# Patient Record
Sex: FEMALE | Race: AMERICAN INDIAN OR ALASKA NATIVE
[De-identification: names, ages, dates, MRNs, and addresses within clinical notes are randomized per-mention and may not be internally consistent; named-entity substitution may affect disease eponyms.]

---

## 2018-07-26 ENCOUNTER — HOSPITAL ENCOUNTER (EMERGENCY)
Dept: HOSPITAL 5 - ED | Age: 38
LOS: 1 days | Discharge: HOME | End: 2018-07-27
Payer: SELF-PAY

## 2018-07-26 DIAGNOSIS — R04.0: Primary | ICD-10-CM

## 2018-07-26 DIAGNOSIS — Z87.891: ICD-10-CM

## 2018-07-26 DIAGNOSIS — I10: ICD-10-CM

## 2018-07-26 PROCEDURE — 99282 EMERGENCY DEPT VISIT SF MDM: CPT

## 2018-07-26 NOTE — EMERGENCY DEPARTMENT REPORT
ED ENT HPI





- General


Chief complaint: Nosebleed


Stated complaint: NOSE BLEED


Time Seen by Provider: 07/26/18 23:28


Source: patient


Mode of arrival: Ambulatory


Limitations: No Limitations





- History of Present Illness


Initial comments: 





This is a 37-year-old female who is unknown to this provider previously.  She 

reports that she is not pregnant.  She presents to the ER with a complaint of 

nontraumatic nosebleeding.  The bleeding is primarily in the right side of the 

nose, there is no trauma there is no cocaine use, there is no nasal picking.  

She denies easy bleeding, bruising, and denies other complaints.


MD complaint: epistaxis


-: Gradual, hour(s)


Location: nose


Severity: moderate


Consistency: intermittent


Improves with: pressure


Worsens with: none





- Related Data


 Home Medications











 Medication  Instructions  Recorded  Confirmed  Last Taken


 


Lisinopril [Zestril TAB] 40 mg PO QDAY 07/26/18 07/26/18 07/26/18


 


Metoprolol [Lopressor TAB] 50 mg PO BID 07/26/18 07/26/18 07/26/18


 


Terazosin [Hytrin] 1 mg PO DAILY 07/26/18 07/26/18 Unknown


 


cloNIDine [Catapres] 0.2 mg PO DAILY 07/26/18 07/26/18 07/26/18








 Previous Rx's











 Medication  Instructions  Recorded  Last Taken  Type


 


Fluticasone [Flonase] 1 spray NS QDAY #1 bottle 07/27/18 Unknown Rx











 Allergies











Allergy/AdvReac Type Severity Reaction Status Date / Time


 


No Known Allergies Allergy   Unverified 07/26/18 20:58














ED Dental HPI





- General


Chief complaint: Nosebleed


Stated complaint: NOSE BLEED


Time Seen by Provider: 07/26/18 23:28


Source: patient


Mode of arrival: Ambulatory


Limitations: No Limitations





- Related Data


 Home Medications











 Medication  Instructions  Recorded  Confirmed  Last Taken


 


Lisinopril [Zestril TAB] 40 mg PO QDAY 07/26/18 07/26/18 07/26/18


 


Metoprolol [Lopressor TAB] 50 mg PO BID 07/26/18 07/26/18 07/26/18


 


Terazosin [Hytrin] 1 mg PO DAILY 07/26/18 07/26/18 Unknown


 


cloNIDine [Catapres] 0.2 mg PO DAILY 07/26/18 07/26/18 07/26/18








 Previous Rx's











 Medication  Instructions  Recorded  Last Taken  Type


 


Fluticasone [Flonase] 1 spray NS QDAY #1 bottle 07/27/18 Unknown Rx











 Allergies











Allergy/AdvReac Type Severity Reaction Status Date / Time


 


No Known Allergies Allergy   Unverified 07/26/18 20:58














ED Review of Systems


ROS: 


Stated complaint: NOSE BLEED


Other details as noted in HPI





Constitutional: denies: fever


Eyes: denies: eye discharge


ENT: epistaxis.  denies: congestion


Respiratory: denies: cough


Cardiovascular: denies: chest pain


Gastrointestinal: denies: abdominal pain


Genitourinary: denies: dysuria


Neurological: denies: headache


Psychiatric: anxiety





ED Past Medical Hx





- Past Medical History


Previous Medical History?: Yes


Hx Hypertension: Yes





- Surgical History


Past Surgical History?: No





- Social History


Smoking Status: Former Smoker


Substance Use Type: None





- Medications


Home Medications: 


 Home Medications











 Medication  Instructions  Recorded  Confirmed  Last Taken  Type


 


Lisinopril [Zestril TAB] 40 mg PO QDAY 07/26/18 07/26/18 07/26/18 History


 


Metoprolol [Lopressor TAB] 50 mg PO BID 07/26/18 07/26/18 07/26/18 History


 


Terazosin [Hytrin] 1 mg PO DAILY 07/26/18 07/26/18 Unknown History


 


cloNIDine [Catapres] 0.2 mg PO DAILY 07/26/18 07/26/18 07/26/18 History


 


Fluticasone [Flonase] 1 spray NS QDAY #1 bottle 07/27/18  Unknown Rx














ED Physical Exam





- General


Limitations: No Limitations


General appearance: alert, in no apparent distress





- Head


Head exam: Present: atraumatic, normocephalic





- Eye


Eye exam: Present: normal appearance, EOMI.  Absent: nystagmus





- ENT


ENT exam: Present: normal orophraynx (speaking in full sentences.  No stridor.  

No obvious intraoral bleeding.), mucous membranes moist, normal external ear 

exam, other (there is minimal oozing noted in the right nose, no obvious 

bruising noted in the left nose.)





- Neck


Neck exam: Present: normal inspection, full ROM.  Absent: tenderness, 

meningismus





- Respiratory


Respiratory exam: Present: normal lung sounds bilaterally.  Absent: respiratory 

distress





- Cardiovascular


Cardiovascular Exam: Present: regular rate, normal rhythm, normal heart sounds.

  Absent: bradycardia, tachycardia, irregular rhythm, systolic murmur, 

diastolic murmur, rubs, gallop





- GI/Abdominal


GI/Abdominal exam: Present: soft.  Absent: distended, tenderness, guarding, 

rebound, rigid, pulsatile mass





- Extremities Exam


Extremities exam: Present: normal inspection, full ROM, other (2+ pulses noted 

in the bilateral upper, lower extremities.  Compartments soft.  No long bony 

tenderness.  The pelvis is stable.).  Absent: calf tenderness





- Back Exam


Back exam: Present: normal inspection, full ROM.  Absent: paraspinal tenderness

, vertebral tenderness





- Neurological Exam


Neurological exam: Present: alert, oriented X3, CN II-XII intact, normal gait, 

other (Extraocular movements intact.  Tongue midline.  No facial droop.  Facial 

sensation intact to light touch in the V1, V2, V3 distribution bilaterally.  5 

and 5 strength in 4 extremities..  Sensation is intact to light touch in 4 

extremities.).  Absent: motor sensory deficit





- Psychiatric


Psychiatric exam: Present: normal affect, normal mood





- Skin


Skin exam: Present: warm, dry, intact, normal color.  Absent: rash





ED Course


 Vital Signs











  07/26/18 07/26/18 07/27/18





  20:59 21:43 00:06


 


Temperature 98.6 F  


 


Pulse Rate 150 H 144 H 83


 


Respiratory 20  20





Rate   


 


Blood Pressure 254/177 254/177 


 


Blood Pressure   233/164





[Left]   


 


O2 Sat by Pulse 100  98





Oximetry   














- Reevaluation(s)


Reevaluation #1: 





07/27/18 00:10


Differential diagnosis, including not limited to: Epistaxis, incidental 

hypertension











Assessment and plan: 37-year-old female with nontraumatic nasal bleeding, 

mostly resolved.  Hypertension is appreciated.  She reports not taking her 

antihypertensive medication but later on today.  Please reference the [employer] 

[employer] critical policy on hypertension that is not 

symptomatically.  Patient has no clinical symptoms of end organ dysfunction at 

this time.











Patient had 2 sprays of oxymetolazone instilled in the right nostril, and a 2 x 

2 project was soaked with 1% lidocaine with epinephrine.  Patient is currently 

applying direct pressure to the nose.








Reevaluation #2: 





07/27/18 00:50


Patient has been reassessed multiple times by me while in the emergency 

department.  Her nose bleeding has essentially resolved, her tachycardia has 

resolved.  She feels comfortable to go home and denies physical pain and other 

complaints at this time.  The patient can follow up with her outpatient primary 

care doctor for her asymptomatic elevated blood pressure.





- Procedure Description


Procedures done: The patient instilled 2 sprays of Afrin into the right 

nostril.  A 2 x 2 pledget was then soaked with 1% lidocaine and epinephrine, 

twisted up and inserted into the right nostril.  A tongue depressor device was 

then applied to the nose, and the patient held direct digital pressure for 20 

minutes.  This was repeated an additional time.  The patient tolerated the 

procedure well, and with no difficulty.


Critical care attestation.: 


If time is entered above; I have spent that time in minutes in the direct care 

of this critically ill patient, excluding procedure time.








ED Disposition


Clinical Impression: 


 Epistaxis





Disposition: DC-01 TO HOME OR SELFCARE


Is pt being admited?: No


Does the pt Need Aspirin: No


Condition: Good


Instructions:  Epistaxis (ED)


Additional Instructions: 


Continue outpatient medications.  Follow up with the primary care doctor within 

the next month for elevated blood pressure.  Blood pressure was very elevated 

in the emergency room, most likely secondary to underlying nasal bleeding.  

However this should be followed up by her primary care doctor.  Long-term 

complications of hypertension and elevated blood pressure include stroke, heart 

attack, disability.  Patient may use the Afrin once every 12 hours, for no more 

than 3 days consecutively.  Patient may also use Flonase as directed.  Bleeding 

may recur within the next few days.  If bleeding reoccurs, instill 2 sprays of 

Afrin into the affected nostril, coat  a 2 x 2 pledget with Afrin as you were 

shown in the emergency room, roll up the pledget and inserted into the nostril, 

with 2 cm of pledget hanging out of the nose.  Then, applied a tongue depressor 

device to apply direct pressure to the nose, and hold fingertips over the 

tongue depressor device for at least 20 minutes.  This will typically stop a 

recurrent nosebleed.  If this does not stop the nosebleed, please return to the 

emergency room right away.








Please return to the ER right away with new pain, worsened pain, migration of 

pain, confusion, projectile vomiting, change in mental status, inability to 

tolerate liquid feeds.  Please follow up with an otolaryngology specialist, 

such as Dr. Emmanuel Coyle within the next 2 weeks if nose bleeding does not 

stop.














Referrals: 


PRIMARY CARE,MD [Primary Care Provider] - 3-5 Days


KOLTON ROSE MD [Staff Physician] - 3-5 Days


PHILLIP CANCHOLA MD [Staff Physician] - 3-5 Days

## 2018-07-27 VITALS — DIASTOLIC BLOOD PRESSURE: 164 MMHG | SYSTOLIC BLOOD PRESSURE: 233 MMHG

## 2020-08-26 ENCOUNTER — HOSPITAL ENCOUNTER (INPATIENT)
Dept: HOSPITAL 5 - ED | Age: 40
LOS: 10 days | Discharge: HOME | DRG: 654 | End: 2020-09-05
Attending: INTERNAL MEDICINE | Admitting: HOSPITALIST
Payer: COMMERCIAL

## 2020-08-26 ENCOUNTER — HOSPITAL ENCOUNTER (EMERGENCY)
Dept: HOSPITAL 5 - ED | Age: 40
Discharge: LEFT BEFORE DISCHARGE | End: 2020-08-26
Payer: SELF-PAY

## 2020-08-26 VITALS — SYSTOLIC BLOOD PRESSURE: 152 MMHG | DIASTOLIC BLOOD PRESSURE: 113 MMHG

## 2020-08-26 DIAGNOSIS — E66.9: ICD-10-CM

## 2020-08-26 DIAGNOSIS — K57.20: ICD-10-CM

## 2020-08-26 DIAGNOSIS — Z53.21: ICD-10-CM

## 2020-08-26 DIAGNOSIS — G47.30: ICD-10-CM

## 2020-08-26 DIAGNOSIS — N17.9: ICD-10-CM

## 2020-08-26 DIAGNOSIS — T85.838A: ICD-10-CM

## 2020-08-26 DIAGNOSIS — Z83.3: ICD-10-CM

## 2020-08-26 DIAGNOSIS — Z87.891: ICD-10-CM
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
## 2020-08-26 DIAGNOSIS — Z82.49: ICD-10-CM

## 2020-08-26 DIAGNOSIS — I50.9: ICD-10-CM

## 2020-08-26 DIAGNOSIS — N32.2: Primary | ICD-10-CM
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
## 2020-08-26 DIAGNOSIS — I11.0: ICD-10-CM

## 2020-08-26 DIAGNOSIS — T85.9XXA: Primary | ICD-10-CM

## 2020-08-26 DIAGNOSIS — N32.89: ICD-10-CM

## 2020-08-26 DIAGNOSIS — B96.89: ICD-10-CM

## 2020-08-26 DIAGNOSIS — E83.52: ICD-10-CM

## 2020-08-26 LAB
ALBUMIN SERPL-MCNC: 3.8 G/DL (ref 3.9–5)
ALT SERPL-CCNC: 8 UNITS/L (ref 7–56)
APTT BLD: 31.5 SEC. (ref 24.2–36.6)
BASOPHILS # (AUTO): 0.1 K/MM3 (ref 0–0.1)
BASOPHILS NFR BLD AUTO: 0.7 % (ref 0–1.8)
BUN SERPL-MCNC: 23 MG/DL (ref 7–17)
BUN/CREAT SERPL: 13 %
CALCIUM SERPL-MCNC: 10.9 MG/DL (ref 8.4–10.2)
EOSINOPHIL # BLD AUTO: 0.2 K/MM3 (ref 0–0.4)
EOSINOPHIL NFR BLD AUTO: 2.8 % (ref 0–4.3)
HCT VFR BLD CALC: 32.6 % (ref 30.3–42.9)
HEMOLYSIS INDEX: 0
HGB BLD-MCNC: 11.2 GM/DL (ref 10.1–14.3)
INR PPP: 1.04 (ref 0.87–1.13)
LYMPHOCYTES # BLD AUTO: 1.5 K/MM3 (ref 1.2–5.4)
LYMPHOCYTES NFR BLD AUTO: 18.3 % (ref 13.4–35)
MCHC RBC AUTO-ENTMCNC: 35 % (ref 30–34)
MCV RBC AUTO: 83 FL (ref 79–97)
MONOCYTES # (AUTO): 0.7 K/MM3 (ref 0–0.8)
MONOCYTES % (AUTO): 8.1 % (ref 0–7.3)
PLATELET # BLD: 518 K/MM3 (ref 140–440)
RBC # BLD AUTO: 3.92 M/MM3 (ref 3.65–5.03)

## 2020-08-26 PROCEDURE — 85018 HEMOGLOBIN: CPT

## 2020-08-26 PROCEDURE — 88309 TISSUE EXAM BY PATHOLOGIST: CPT

## 2020-08-26 PROCEDURE — 36415 COLL VENOUS BLD VENIPUNCTURE: CPT

## 2020-08-26 PROCEDURE — 88305 TISSUE EXAM BY PATHOLOGIST: CPT

## 2020-08-26 PROCEDURE — 85610 PROTHROMBIN TIME: CPT

## 2020-08-26 PROCEDURE — 87186 SC STD MICRODIL/AGAR DIL: CPT

## 2020-08-26 PROCEDURE — 87086 URINE CULTURE/COLONY COUNT: CPT

## 2020-08-26 PROCEDURE — P9016 RBC LEUKOCYTES REDUCED: HCPCS

## 2020-08-26 PROCEDURE — 86850 RBC ANTIBODY SCREEN: CPT

## 2020-08-26 PROCEDURE — 85025 COMPLETE CBC W/AUTO DIFF WBC: CPT

## 2020-08-26 PROCEDURE — 86900 BLOOD TYPING SEROLOGIC ABO: CPT

## 2020-08-26 PROCEDURE — 87040 BLOOD CULTURE FOR BACTERIA: CPT

## 2020-08-26 PROCEDURE — 74178 CT ABD&PLV WO CNTR FLWD CNTR: CPT

## 2020-08-26 PROCEDURE — 86901 BLOOD TYPING SEROLOGIC RH(D): CPT

## 2020-08-26 PROCEDURE — A4217 STERILE WATER/SALINE, 500 ML: HCPCS

## 2020-08-26 PROCEDURE — 82962 GLUCOSE BLOOD TEST: CPT

## 2020-08-26 PROCEDURE — 85014 HEMATOCRIT: CPT

## 2020-08-26 PROCEDURE — 80048 BASIC METABOLIC PNL TOTAL CA: CPT

## 2020-08-26 PROCEDURE — 85730 THROMBOPLASTIN TIME PARTIAL: CPT

## 2020-08-26 PROCEDURE — G0378 HOSPITAL OBSERVATION PER HR: HCPCS

## 2020-08-26 PROCEDURE — 80053 COMPREHEN METABOLIC PANEL: CPT

## 2020-08-26 PROCEDURE — 87075 CULTR BACTERIA EXCEPT BLOOD: CPT

## 2020-08-26 PROCEDURE — 86920 COMPATIBILITY TEST SPIN: CPT

## 2020-08-26 PROCEDURE — 74176 CT ABD & PELVIS W/O CONTRAST: CPT

## 2020-08-26 PROCEDURE — 87076 CULTURE ANAEROBE IDENT EACH: CPT

## 2020-08-26 PROCEDURE — 87116 MYCOBACTERIA CULTURE: CPT

## 2020-08-26 PROCEDURE — 85007 BL SMEAR W/DIFF WBC COUNT: CPT

## 2020-08-26 PROCEDURE — C1769 GUIDE WIRE: HCPCS

## 2020-08-26 RX ADMIN — METOPROLOL TARTRATE SCH MG: 50 TABLET, FILM COATED ORAL at 22:02

## 2020-08-26 RX ADMIN — MORPHINE SULFATE PRN MG: 2 INJECTION, SOLUTION INTRAMUSCULAR; INTRAVENOUS at 22:14

## 2020-08-26 RX ADMIN — MORPHINE SULFATE PRN MG: 2 INJECTION, SOLUTION INTRAMUSCULAR; INTRAVENOUS at 18:54

## 2020-08-26 RX ADMIN — MORPHINE SULFATE PRN MG: 2 INJECTION, SOLUTION INTRAMUSCULAR; INTRAVENOUS at 22:01

## 2020-08-26 RX ADMIN — SODIUM CHLORIDE SCH MLS/HR: 0.9 INJECTION, SOLUTION INTRAVENOUS at 18:53

## 2020-08-26 NOTE — EVENT NOTE
Date: 08/26/20


39 year old female With diverticulitis complicated by abscess with 

extraperitoneal and retroperitoneal spread with subsequent bladder injury requir

ing drain placement with recent hematuria/blood from drains and rice.





Despite this, hemoglobin has actually improved.





Patient has elevated creatinine which could be from resorption of urine (sugges

crescencio by urology) or acute kidney injury.  After this has improved/resolved then 

multiphase CT can be ordered.

## 2020-08-26 NOTE — CONSULTATION
HISTORY OF PRESENT ILLNESS:  The patient is a 39-year-old woman who had severe

diverticular disease, had erosion with bladder fistula, had a percutaneous drain

placed and laparoscopy.  The question about cystoscopy was discussed and her

drainage was decreasing, so she went home.  I saw her 2 days ago and there was

minimal drainage and the catheter was clear.  She started bleeding late last

night.  There is blood in the drain and blood in the urine.  I changed the

catheter in the office and she is in the Emergency Room for evaluation.  Her

hemoglobin is over 11.  She is hemodynamically stable.  I spoke to Dr. Alonso and

Dr. Guajardo and he wants to do a contrast CT scan because the creatinine is 1.8. 

She has no significant pain.



PAST MEDICAL HISTORY:  As mentioned above.  She denies any urological surgery.



PHYSICAL EXAMINATION:  

ABDOMEN:  Soft.  There are drains that are draining bloody urine.  When we

irrigated the Gonsalves, there is some urine coming around the tube.  She has a 22

three way and abdomen is soft.



IMPRESSION:  Sudden gross hematuria and gross blood from the drain,

hemodynamically stable.  She will be admitted and may need a closure of the

fistula or exploration pending radiological evaluation.





DD: 08/26/2020 14:42

DT: 08/26/2020 18:56

JOB# [id number]  [id number]

PONCHO/LINDSAY

## 2020-08-26 NOTE — HISTORY AND PHYSICAL REPORT
History of Present Illness


Date of examination: 08/26/20


Date of admission: 


08/26/20 15:34





Chief complaint: 





Gross hematuria


History of present illness: 





38yo AA female with hx of complicated sigmoid diverticulitis admitted in early 

August and underwent diagnostic laparoscopy and drain placement and also had a 

suprapubic catheter placed, presents to the ED with complaints of gross 

hematuria through the  suprapubic catheter as well as through the JULIANO drain .





She came to the clinic with reports that last night she began to have bloody 

output from the drains.  She reports feeling lightheaded and nauseated.  Her 

abdominal pain began to increase a few days ago.  She has not had a bowel 

movement in a few days.  CT of the abdomen and pelvis without contrast was 

performed as the patient had renal insufficiency and it showed urinary/bladder 

leak and resolving pelvic abscess.  General surgery was consulted as well as 

vascular surgery.  Patient is being admitted for further evaluation of her gross

hematuria through the SPC catheter.  She was recommended by general surgery not 

to start on a antibiotics.  Patient to have repeat CT of the abdomen and pelvis 

with contrast once acute renal failure improves.  She presently complains of 

left-sided abdominal pain which has been there since her last surgery but states

that it is worse today, and also complains of nausea but denies any vomiting.  

She states she had fever 2 days ago but denies any fever or chills since then








Past History


Past Medical History: heart failure, hypertension


Past Surgical History: Other (dx laparoscopy and IR drainage placement)


Social history: denies: smoking, alcohol abuse


Family history: cancer (Unknown cancer in her mother, she thinks it was stomach 

cancer), diabetes, hypertension, other (bowel problems)





Medications and Allergies


                                    Allergies











Allergy/AdvReac Type Severity Reaction Status Date / Time


 


No Known Allergies Allergy   Unverified 07/26/18 20:58











                                Home Medications











 Medication  Instructions  Recorded  Confirmed  Last Taken  Type


 


Metoprolol [Lopressor TAB] 50 mg PO BID 07/26/18 08/07/20 07/26/18 History


 


Terazosin [Hytrin] 1 mg PO DAILY 07/26/18 08/07/20 Unknown History


 


cloNIDine [Catapres] 0.2 mg PO DAILY 07/26/18 08/07/20 07/26/18 History


 


lisinopriL [Zestril TAB] 40 mg PO QDAY 07/26/18 08/07/20 07/26/18 History


 


Fluticasone [Flonase] 1 spray NS QDAY #1 bottle 07/27/18 08/07/20 Unknown Rx


 


Amoxicillin/Potassium Clav 1 each PO BID #20 tablet 08/18/20  Unknown Rx





[Augmentin 875-125 Tablet]     











Active Meds: 


Active Medications





Acetaminophen (Tylenol)  650 mg PO Q4H PRN


   PRN Reason: Pain MILD(1-3)/Fever >100.5/HA


Amlodipine Besylate (Amlodipine)  5 mg PO QDAY ANKUSH


Sodium Chloride (Nacl 0.9% 1000 Ml)  1,000 mls @ 125 mls/hr IV AS DIRECT ANKUSH


   Last Admin: 08/26/20 18:53 Dose:  125 mls/hr


   Documented by: 


Metoprolol Tartrate (Metoprolol)  50 mg PO BID ANKUSH


Morphine Sulfate (Morphine)  2 mg IV Q4H PRN


   PRN Reason: Pain, Moderate (4-6)


   Last Admin: 08/26/20 18:54 Dose:  2 mg


   Documented by: 


Ondansetron HCl (Zofran)  4 mg IV Q8H PRN


   PRN Reason: Nausea And Vomiting


Sodium Chloride (Sodium Chloride Flush Syringe 10 Ml)  10 ml IV BID ANKUSH


Sodium Chloride (Sodium Chloride Flush Syringe 10 Ml)  10 ml IV PRN PRN


   PRN Reason: LINE FLUSH











Review of Systems


All systems: negative (12 point review of systems is unremarkable except as 

stated above in the history of present illness)





Exam





- Constitutional


Vitals: 


                                        











Temp Pulse Resp BP Pulse Ox


 


 98.0 F   89   17   178/98   100 


 


 08/26/20 12:30  08/26/20 19:46  08/26/20 19:46  08/26/20 19:46  08/26/20 19:46











General appearance: Present: no acute distress, well-nourished





- EENT


Eyes: Present: PERRL, EOM intact


ENT: hearing intact, clear oral mucosa





- Neck


Neck: Present: supple, normal ROM





- Respiratory


Respiratory effort: normal


Respiratory: bilateral: CTA





- Cardiovascular


Rhythm: regular


Heart Sounds: Present: S1 & S2





- Extremities


Extremities: No edema





- Abdominal


General gastrointestinal: Present: soft, tender, other (Has a JULIANO drain in the 

left flank  and a suprapubic catheter grossly bloody urine in the container)


Female genitourinary: Present: deferred





- Rectal


Rectal Exam: deferred





- Integumentary


Integumentary: Present: clear





- Musculoskeletal


Musculoskeletal: strength equal bilaterally





- Psychiatric


Psychiatric: appropriate mood/affect





- Neurologic


Neurologic: no focal deficits





Assessment and Plan





- Patient Problems


(1) Hypertension


Current Visit: Yes   Status: Chronic   


Qualifiers: 


   Hypertension type: essential hypertension   Qualified Code(s): I10 - 

Essential (primary) hypertension   


Plan to address problem: 


Resume beta-blocker


Hold lisinopril due to acute kidney injury


Start on amlodipine


Monitor blood pressure








(2) Hypercalcemia


Current Visit: Yes   Status: Acute   


Plan to address problem: 


 mild


Likely secondary to dehydration


Monitor serum calcium


IV fluids








(3) FARHAN (acute kidney injury)


Current Visit: Yes   Status: Acute   


Plan to address problem: 


Most likely ATN from volume depletion


 IV fluids and monitor renal function











(4) Abdominal pain


Current Visit: Yes   Status: Chronic   


Qualifiers: 


   Abdominal location: left lower quadrant   Qualified Code(s): R10.32 - Left 

lower quadrant pain   


Plan to address problem: 


Secondary to perforated sigmoid diverticulitis


Status post recent surgery


The pain is mild and states that the pain is slightly increased  today








(5) Hematuria


Current Visit: Yes   Status: Acute   


Qualifiers: 


   Glomerular morphologic changes: unspecified whether glomerular morphologic 

changes present 


Plan to address problem: 


Although patient has gross hematuria her hemoglobin and hematocrit appears 

stable, and unchanged since last discharge


Urology was consulted











(6) Perforation of sigmoid colon due to diverticulitis


Current Visit: No   Status: Acute   


Plan to address problem: 


Status post surgery 3 weeks ago


Surgery consulted and note reviewed and appreciated 


no antibiotics at this time

## 2020-08-26 NOTE — CAT SCAN REPORT
CT ABDOMEN AND PELVIS WITHOUT CONTRAST



HISTORY: acute abd. History of bladder leak



COMPARISON: CT pelvis from 8/17/2020 and CT abdomen from 8/11/2020



TECHNIQUE: CT images of the abdomen and pelvis were obtained without administration of intravenous co
ntrast.  All CT scans at this location are performed using CT dose reduction for ALARA by means of au
tomated exposure control. 



FINDINGS:



Lungs/bones:  There is mild bibasilar atelectasis. No acute osseous abnormality or significant DJD.



Abdomen/pelvis:  A suprapubic pigtail catheter remains in place within the abscess cavity anterior an
d cranial to the urinary bladder. The cavity now contains primarily air with only a trace amount resi
dual fluid. The degree of surrounding inflammatory stranding is similar to slightly improved as well.
 Even though the bladder is mostly collapsed with a Gonsalves catheter in place, the bladder wall defect 
is again well demonstrated (for example see images [id number] of series #3).



The gallbladder, spleen, pancreas, adrenals, kidneys, and proximal GI tract appear unremarkable. The 
liver is mildly enlarged.



There is a stable cystic structure in the left adnexal region measuring approximately 5.4 cm in maxim
al dimension. Occasional colonic diverticula are present with no acute inflammatory change identified
. A drain remains in the left flank collection which is primarily collapsed with only a small amount 
of gas.



IMPRESSION:

1. Persistent urinary bladder leak which is well demonstrated on this exam even though the bladder is
 mostly collapsed and the deep pelvic abscess has decreased in size. The abscess contains mostly air 
with only a trace amount of residual fluid.

2. Additional incidental findings as above.



Signer Name: Kel Lamar MD 

Signed: 8/26/2020 3:13 PM

Workstation Name: [id number]

## 2020-08-26 NOTE — EMERGENCY DEPARTMENT REPORT
ED General Adult HPI





- General


Chief complaint: Urogenital-Female


Stated complaint: BLOOD IN DRAINS


PUI?: No


Time Seen by Provider: 08/26/20 12:41


Source: patient, RN notes reviewed, old records reviewed


Mode of arrival: Ambulatory


Limitations: No Limitations





- History of Present Illness


Initial comments: 





During the entire history and physical examination, I am chaperone and escorted 

by nurse Isabel ÁNGELA Boss





Patient is a 39-year-old female.  She is not known to myself previously.  Her 

past medical history is complicated, including hypertension, obesity 

hypoventilation syndrome.





Patient was admitted to the hospital in the beginning of the month for presumed 

perforated diverticulitis with abscess.





While in the hospital, the patient had a CT scan which showed inflammation and 

abscess, with plan for surgical drainage.





Patient went to the operating room with a drain placement.





Cultures were negative.  Infectious disease followed the patient in 

consultation.





Patient still having significant drainage from intra-abdominal drain.





Patient followed by general surgery, interventional radiology, and urology.  She

was discharged with a Gonsalves catheter in place, left-sided flank drain, and 

suprapubic drain.





This morning, she saw her urologist, Dr. EDNA Salazar, and her general surgeon, 

Dr. Guajardo.





She was having new onset bloody discharge from her Gonsalves catheter, and left-

sided flank drain.  She also complained of left-sided flank pain.  She believes 

that she had a fever at home but does not know the exact temperature.





Denies headache, neck pain, chest pain.  Positive acute on chronic abdominal 

pain.  States that she feels like her "bladder is full", and has new onset gross

hematuria which started yesterday.  She also has new onset bloody discharge from

her surgical drains which is new onset since yesterday.





These new symptoms have been constant, do not radiate anywhere, abdominal pain 

increases with palpation, decreases with rest and palpation.





She had laboratory studies earlier on today ordered by outpatient surgeons which

demonstrated renal insufficiency.





A noncontrast CT scan of the abdomen pelvis today demonstrated the following:


Persistent urinary bladder leak which is well demonstrated on this exam even 

though the bladder is mostly collapsed and the deep pelvic abscess has decreased

in size. The abscess contains mostly air with only a trace amount of residual 

fluid. 2. Additional incidental findings as above.








Case was discussed with Michela Guajardo, GavinShruti





Admission is recommended for supportive care, IV hydration, pain control, and if

renal function improves, repeat CT scan of the abdomen pelvis with and without 

IV contrast.





Hospital physician, Dr. Scott to admit the patient as per this hospital's 

protocol policy.


-: Sudden


Location: abdomen


Radiation: non-radiation


Quality: aching


Consistency: constant


Improves with: medication, rest


Worsens with: movement





- Related Data


                                Home Medications











 Medication  Instructions  Recorded  Confirmed  Last Taken


 


Metoprolol [Lopressor TAB] 50 mg PO BID 07/26/18 08/07/20 07/26/18


 


Terazosin [Hytrin] 1 mg PO DAILY 07/26/18 08/07/20 Unknown


 


cloNIDine [Catapres] 0.2 mg PO DAILY 07/26/18 08/07/20 07/26/18


 


lisinopriL [Zestril TAB] 40 mg PO QDAY 07/26/18 08/07/20 07/26/18








                                  Previous Rx's











 Medication  Instructions  Recorded  Last Taken  Type


 


Fluticasone [Flonase] 1 spray NS QDAY #1 bottle 07/27/18 Unknown Rx


 


Amoxicillin/Potassium Clav 1 each PO BID #20 tablet 08/18/20 Unknown Rx





[Augmentin 875-125 Tablet]    











                                    Allergies











Allergy/AdvReac Type Severity Reaction Status Date / Time


 


No Known Allergies Allergy   Unverified 07/26/18 20:58














ED Review of Systems


ROS: 


Stated complaint: BLOOD IN DRAINS


Other details as noted in HPI





Constitutional: malaise.  denies: weakness


Eyes: denies: eye discharge


ENT: denies: congestion


Respiratory: denies: wheezing


Cardiovascular: denies: syncope


Gastrointestinal: abdominal pain


Genitourinary: hematuria


Neurological: weakness





ED Past Medical Hx





- Past Medical History


Hx Hypertension: Yes


Hx Heart Attack/AMI: No


Hx Congestive Heart Failure: Yes


Hx Liver Disease: No


Hx Renal Disease: No


Hx Sickle Cell Disease: No


Hx Seizures: No


Hx Asthma: No


Hx COPD: No





- Surgical History


Hx Pacemaker: No


Hx Internal Defibrillator: No





- Social History


Smoking Status: Never Smoker





- Medications


Home Medications: 


                                Home Medications











 Medication  Instructions  Recorded  Confirmed  Last Taken  Type


 


Metoprolol [Lopressor TAB] 50 mg PO BID 07/26/18 08/07/20 07/26/18 History


 


Terazosin [Hytrin] 1 mg PO DAILY 07/26/18 08/07/20 Unknown History


 


cloNIDine [Catapres] 0.2 mg PO DAILY 07/26/18 08/07/20 07/26/18 History


 


lisinopriL [Zestril TAB] 40 mg PO QDAY 07/26/18 08/07/20 07/26/18 History


 


Fluticasone [Flonase] 1 spray NS QDAY #1 bottle 07/27/18 08/07/20 Unknown Rx


 


Amoxicillin/Potassium Clav 1 each PO BID #20 tablet 08/18/20  Unknown Rx





[Augmentin 875-125 Tablet]     














ED Physical Exam





- General


Limitations: No Limitations


General appearance: alert, obese





- Head


Head exam: Present: atraumatic, normocephalic





- Eye


Eye exam: Present: normal appearance, EOMI.  Absent: nystagmus





- ENT


ENT exam: Present: normal exam, normal orophraynx, mucous membranes moist, 

normal external ear exam





- Neck


Neck exam: Present: normal inspection, full ROM.  Absent: tenderness, 

meningismus





- Respiratory


Respiratory exam: Present: normal lung sounds bilaterally.  Absent: respiratory 

distress





- Cardiovascular


Cardiovascular Exam: Present: normal rhythm, tachycardia, normal heart sounds.  

Absent: systolic murmur, diastolic murmur, rubs, gallop





- GI/Abdominal


GI/Abdominal exam: Present: soft, tenderness, other (There is a suprapubic 

catheter draining bloody urine.  There is left lower quadrant and left flank 

tenderness.  There is a left-sided drainage system that has grossly bloody 

output.).  Absent: distended, guarding, rebound, rigid, pulsatile mass





- Extremities Exam


Extremities exam: Present: normal inspection, full ROM, other (2+ pulses noted 

in the bilateral upper and lower extremities.  There is no palpable cord.   

negative Homans sign.  Muscular compartments are soft.  The pelvis is stable.). 

Absent: calf tenderness





- Back Exam


Back exam: Present: normal inspection, full ROM.  Absent: CVA tenderness (L), 

paraspinal tenderness, vertebral tenderness





- Neurological Exam


Neurological exam: Present: alert, other (No facial droop.  Tongue midline.  

Extraocular movements intact bilaterally.  Facial sensation intact to light 

touch in V1, V2, V3 distribution bilaterally.  5 and a 5 strength in 4 

extremities.  Sensation intact to light touch in 4 extremities.)





- Psychiatric


Psychiatric exam: Present: normal affect, normal mood





- Skin


Skin exam: Present: warm, dry, intact, normal color.  Absent: rash





ED Course


                                   Vital Signs











  08/26/20 08/26/20





  12:30 14:40


 


Temperature 98.0 F 


 


Pulse Rate 109 H 80


 


Respiratory 18 18





Rate  


 


Blood Pressure 168/123 


 


Blood Pressure  172/107





[Left]  


 


O2 Sat by Pulse 100 100





Oximetry  














ED Medical Decision Making





- Lab Data








                                   Vital Signs











  08/26/20 08/26/20





  12:30 14:40


 


Temperature 98.0 F 


 


Pulse Rate 109 H 80


 


Respiratory 18 18





Rate  


 


Blood Pressure 168/123 


 


Blood Pressure  172/107





[Left]  


 


O2 Sat by Pulse 100 100





Oximetry  








Laboratory studies from earlier on today are reviewed and appreciated





- Radiology Data


Radiology results: report reviewed, image reviewed





CT ABDOMEN AND PELVIS WITHOUT CONTRAST  HISTORY: acute abd. History of bladder 

leak  COMPARISON: CT pelvis from 8/17/2020 and CT abdomen from 8/11/2020  

TECHNIQUE: CT images of the abdomen and pelvis were obtained without 

administration of intravenous contrast. All CT scans at this location are 

performed using CT dose reduction for ALARA by means of automated exposure 

control.  FINDINGS:  Lungs/bones: There is mild bibasilar atelectasis. No acute 

osseous abnormality or significant DJD.  








Abdomen/pelvis: A suprapubic pigtail catheter remains in place within the 

abscess cavity anterior and cranial to the urinary bladder. The cavity now 

contains primarily air with only a trace amount residual fluid. The degree of 

surrounding inflammatory stranding is similar to slightly improved as well. Even

 though the bladder is mostly collapsed with a Gonsalves catheter in place, the 

bladder wall defect is again well demonstrated (for example see images 559-565 

of series #3).  The gallbladder, spleen, pancreas, adrenals, kidneys, and 

proximal GI tract appear unremarkable. The liver is mildly enlarged.  There is a

 stable cystic structure in the left adnexal region measuring approximately 5.4 

cm in maximal dimension. Occasional colonic diverticula are present with no 

acute inflammatory change identified. A drain remains in the left flank 

collection which is primarily collapsed with only a small amount of gas. 








 IMPRESSION: 1. Persistent urinary bladder leak which is well demonstrated on 

this exam even though the bladder is mostly collapsed and the deep pelvic 

abscess has decreased in size. The abscess contains mostly air with only a trace

 amount of residual fluid. 2. Additional incidental findings as above.  Signer 

Name: Kel Lamar MD Signed: 8/26/2020 2:13 PM Workstation Name: [id number]





- Medical Decision Making





Differential diagnosis, including but not limited to: Bleeding, damage to 

adjacent structures, complication of recent surgery/diverticulitis/abscess


Dehydration, renal insufficiency














Assessment and plan: 39-year-old female with very complex recent surgical 

history, now presenting with acute on chronic abdominal pain, bloody discharge 

from her drains.





We have discussed the case and obtain consultation from general surgery, 

urology, and interventional radiology.  We will give fluids, and pain control.





General surgeon specifically recommends against antibiotics at this time.





Patient will require admission, she is amenable to this plan of care.











Critical care attestation.: 


If time is entered above; I have spent that time in minutes in the direct care 

of this critically ill patient, excluding procedure time.








ED Disposition


Clinical Impression: 


 FARHAN (acute kidney injury)





Abdominal pain


Qualifiers:


 Abdominal location: generalized Qualified Code(s): R10.84 - Generalized 

abdominal pain





Hematuria


Qualifiers:


 Glomerular morphologic changes: unspecified whether glomerular morphologic 

changes present 





Bleeding from Renzo-Salazar drain


Qualifiers:


 Encounter type: initial encounter Qualified Code(s): T85.838A - Hemorrhage due 

to other internal prosthetic devices, implants and grafts, initial encounter





Disposition: DC-09 OP ADMIT IP TO THIS HOSP


Is pt being admited?: Yes


Does the pt Need Aspirin: No


Condition: Serious

## 2020-08-26 NOTE — CONSULTATION
History of Present Illness


Consult date: 08/26/20


Reason for consult: abdominal pain


Requesting physician: JEANE BARRY


Chief complaint: 





bloody drainage





- History of present illness


History of present illness: 





40yo F is well-known to our service as we originally consulted on her when she 

presented with a complicated sigmoid diverticulitis.  She ended up having 

involvement of the bladder for which drains were placed and a Rice catheter was

placed.  She underwent a diagnostic laparoscopy and drain placement for a left 

flank collection that we believe was from the perforated diverticulitis that 

perforated into her extraperitoneal space.





She came to the clinic with reports that last night she began to have bloody 

output from the drains.  She reports feeling lightheaded and nauseated.  Her 

abdominal pain began to increase a few days ago.  She has not had a bowel 

movement in a few days.  She was sent to the emergency room for further 

evaluation and treatment.  We are being asked to consult.





Past History


Past Medical History: heart failure, hypertension


Past Surgical History: Other (dx laparoscopy and IR drainage placement)


Social history: denies: smoking, alcohol abuse


Family history: other (bowel problems)





Medications and Allergies


                                    Allergies











Allergy/AdvReac Type Severity Reaction Status Date / Time


 


No Known Allergies Allergy   Unverified 07/26/18 20:58











                                Home Medications











 Medication  Instructions  Recorded  Confirmed  Last Taken  Type


 


Metoprolol [Lopressor TAB] 50 mg PO BID 07/26/18 08/07/20 07/26/18 History


 


Terazosin [Hytrin] 1 mg PO DAILY 07/26/18 08/07/20 Unknown History


 


cloNIDine [Catapres] 0.2 mg PO DAILY 07/26/18 08/07/20 07/26/18 History


 


lisinopriL [Zestril TAB] 40 mg PO QDAY 07/26/18 08/07/20 07/26/18 History


 


Fluticasone [Flonase] 1 spray NS QDAY #1 bottle 07/27/18 08/07/20 Unknown Rx


 


Amoxicillin/Potassium Clav 1 each PO BID #20 tablet 08/18/20  Unknown Rx





[Augmentin 875-125 Tablet]     














Review of Systems





- Constitutional


sweats, fatigue, weakness, no fever, no chills





- Cardiovascular


chest pain, lightheadedness





- Respiratory


no cough





- Gastrointestinal


abdominal pain, nausea, constipation, change in bowel habits, other (bloody 

output from drains), no vomiting





- Integumentary


no redness, no wounds





Exam


                                   Vital Signs











Temp Pulse Resp BP Pulse Ox


 


 98.0 F   109 H  18   168/123   100 


 


 08/26/20 12:30  08/26/20 12:30  08/26/20 12:30  08/26/20 12:30 08/26/20 12:30














- General physical appearance


Positive: no distress, no pain, other (Appears weak and diaphoretic.)





- Eyes


Positive: normal occular movement





- Respiratory


Positive: normal expansion, normal respiratory effort





- Cardiovascular


Rhythm: regular (, but tachycardic)





- Abdomen


Abdomen: Present: soft, tender (mild), other (thick output from left flank 

drain; dark blood in IR drain. Minimal bloody output in rice bag).  Absent: 

guarding, rigid





- Integumentary


no rash, no growths, no abnormal pigmentation





- Neurologic


Neurologic: alert and oriented to time, place and person





- Psychiatric


Psychiatric: appropriate mood/affect, intact judgment & insight, cooperative





Results





- Imaging


CT scan - abdomen: image reviewed


CT scan - pelvis: image reviewed





Assessment and Plan





- Patient Problems


(1) Abdominal pain


Current Visit: Yes   Status: Acute   


Qualifiers: 


   Abdominal location: generalized   Qualified Code(s): R10.84 - Generalized 

abdominal pain   


Plan to address problem: 


Pt is hypovolemic, partially from the blood loss.  Patient in need of 

resuscitation.  Discussed plan with Dr. Alonso and Dr. Salazar.  Once patient is 

resuscitated, detail CT scan with contrast will be performed per Dr. Alonso's 

recommendations.  If there is any active extravasation, then will see if 

interventional radiology may be able to treat that.





All 3 drains are in communication.  Therefore it is not unexpected to see some 

aspect of the bloody drainage in all 3 drains.





We will follow along.  Please call with any questions.





Time=30min

## 2020-08-27 LAB
BASOPHILS # (AUTO): 0.1 K/MM3 (ref 0–0.1)
BASOPHILS NFR BLD AUTO: 0.6 % (ref 0–1.8)
BUN SERPL-MCNC: 15 MG/DL (ref 7–17)
BUN/CREAT SERPL: 13 %
CALCIUM SERPL-MCNC: 9.3 MG/DL (ref 8.4–10.2)
EOSINOPHIL # BLD AUTO: 0.3 K/MM3 (ref 0–0.4)
EOSINOPHIL NFR BLD AUTO: 3.3 % (ref 0–4.3)
HCT VFR BLD CALC: 26.2 % (ref 30.3–42.9)
HEMOLYSIS INDEX: 25
HGB BLD-MCNC: 9 GM/DL (ref 10.1–14.3)
LYMPHOCYTES # BLD AUTO: 1.5 K/MM3 (ref 1.2–5.4)
LYMPHOCYTES NFR BLD AUTO: 18.5 % (ref 13.4–35)
MCHC RBC AUTO-ENTMCNC: 34 % (ref 30–34)
MCV RBC AUTO: 84 FL (ref 79–97)
MONOCYTES # (AUTO): 0.7 K/MM3 (ref 0–0.8)
MONOCYTES % (AUTO): 8.6 % (ref 0–7.3)
PLATELET # BLD: 407 K/MM3 (ref 140–440)
RBC # BLD AUTO: 3.14 M/MM3 (ref 3.65–5.03)

## 2020-08-27 RX ADMIN — MORPHINE SULFATE PRN MG: 2 INJECTION, SOLUTION INTRAMUSCULAR; INTRAVENOUS at 03:08

## 2020-08-27 RX ADMIN — MORPHINE SULFATE PRN MG: 2 INJECTION, SOLUTION INTRAMUSCULAR; INTRAVENOUS at 15:20

## 2020-08-27 RX ADMIN — MORPHINE SULFATE PRN MG: 2 INJECTION, SOLUTION INTRAMUSCULAR; INTRAVENOUS at 21:40

## 2020-08-27 RX ADMIN — Medication PRN ML: at 21:44

## 2020-08-27 RX ADMIN — METOPROLOL TARTRATE SCH MG: 50 TABLET, FILM COATED ORAL at 21:42

## 2020-08-27 RX ADMIN — METOPROLOL TARTRATE SCH MG: 50 TABLET, FILM COATED ORAL at 10:14

## 2020-08-27 RX ADMIN — SODIUM CHLORIDE SCH MLS/HR: 0.9 INJECTION, SOLUTION INTRAVENOUS at 13:41

## 2020-08-27 RX ADMIN — MORPHINE SULFATE PRN MG: 2 INJECTION, SOLUTION INTRAMUSCULAR; INTRAVENOUS at 10:31

## 2020-08-27 RX ADMIN — SODIUM CHLORIDE SCH MLS/HR: 0.9 INJECTION, SOLUTION INTRAVENOUS at 03:24

## 2020-08-27 RX ADMIN — SODIUM CHLORIDE SCH MLS/HR: 0.9 INJECTION, SOLUTION INTRAVENOUS at 06:15

## 2020-08-27 RX ADMIN — MORPHINE SULFATE PRN MG: 2 INJECTION, SOLUTION INTRAMUSCULAR; INTRAVENOUS at 18:43

## 2020-08-27 RX ADMIN — ACETAMINOPHEN PRN MG: 325 TABLET ORAL at 13:38

## 2020-08-27 NOTE — PROGRESS NOTE
Assessment and Plan





- Patient Problems


(1) Hypertension


Current Visit: Yes   Status: Chronic   


Qualifiers: 


   Hypertension type: essential hypertension   Qualified Code(s): I10 - 

Essential (primary) hypertension   


Plan to address problem: 


poorly controlled


add clonidine BID. cont. amlodipine and BB








(2) Hypercalcemia


Current Visit: Yes   Status: Acute   


Plan to address problem: 


improved with IVF








(3) FARHAN (acute kidney injury)


Current Visit: Yes   Status: Acute   


Plan to address problem: 


improved. S cr down to 1.2


Discussed with general surgery and vascular surgery


CT of the abdomen and pelvis with contrast ordered by vascular surgery








(4) Abdominal pain


Current Visit: Yes   Status: Chronic   


Qualifiers: 


   Abdominal location: left lower quadrant   Qualified Code(s): R10.32 - Left 

lower quadrant pain   


Plan to address problem: 


Secondary to perforated sigmoid diverticulitis


Status post recent surgery


The pain is mild and states that the pain is slightly increased  today








(5) Hematuria


Current Visit: Yes   Status: Acute   


Qualifiers: 


   Glomerular morphologic changes: unspecified whether glomerular morphologic 

changes present 


Plan to address problem: 


Although patient has gross hematuria her hemoglobin and hematocrit appears 

stable, and unchanged since last discharge


Checked drainage bag and urinary catheter bag, has serosanguineous urine which 

is not grossly bloody


Urology note reviewed.  Scheduled for cystoscopy on Monday











(6) Perforation of sigmoid colon due to diverticulitis


Current Visit: No   Status: Acute   


Plan to address problem: 


Status post surgery 3 weeks ago


Surgery consulted and note reviewed and appreciated 


no antibiotics at this time per surgery recommendations











Subjective


Date of service: 08/27/20


Principal diagnosis: Abdominal abscess, bladder fistula


Interval history: 





40 yo AA female with hx of complicated sigmoid diverticulitis admitted in early 

August and underwent diagnostic laparoscopy and drain placement and also had a 

suprapubic catheter placed, presents to the ED with complaints of gross 

hematuria through the  suprapubic catheter as well as through the JULIANO drain .


Patient was admitted for further evaluation of gross hematuria and blood through

the JULIANO drain





8/27 patient is alert and oriented, feels better, still has pain in the left mid

abdomen but states it is better


She denies any nausea or vomiting, denies fever chills


Vascular, general surgery and urology notes reviewed


Lab results reviewed





Objective





- Constitutional


Vitals: 


                               Vital Signs - 12hr











  08/26/20 08/26/20 08/26/20





  22:00 22:02 22:14


 


Temperature   


 


Pulse Rate  89 


 


Respiratory   18





Rate   


 


Respiratory 18  





Rate [Lower   





Abdomen]   


 


Blood Pressure  178/98 


 


O2 Sat by Pulse   





Oximetry   














  08/26/20 08/27/20 08/27/20





  23:24 03:08 04:16


 


Temperature 99.2 F  97.8 F


 


Pulse Rate 87  87


 


Respiratory 18 20 20





Rate   


 


Respiratory   





Rate [Lower   





Abdomen]   


 


Blood Pressure 166/104  184/112


 


O2 Sat by Pulse 94  98





Oximetry   














  08/27/20





  08:40


 


Temperature 99.4 F


 


Pulse Rate 97 H


 


Respiratory 20





Rate 


 


Respiratory 





Rate [Lower 





Abdomen] 


 


Blood Pressure 179/109


 


O2 Sat by Pulse 97





Oximetry 











General appearance: Present: no acute distress, well-nourished





- EENT


Eyes: PERRL, EOM intact


ENT: hearing intact, clear oral mucosa





- Neck


Neck: supple, normal ROM





- Respiratory


Respiratory effort: normal


Respiratory: bilateral: CTA





- Breasts


Breasts: deferred





- Cardiovascular


Rhythm: regular


Heart Sounds: Present: S1 & S2


Extremities: No edema





- Gastrointestinal


General gastrointestinal: Present: soft, tender (along left mid abdomen (mild)),

other (Has left flank JULIANO drain and SPC catheter)


Rectal Exam: deferred





- Integumentary


Integumentary: clear





- Musculoskeletal


Musculoskeletal: strength equal bilaterally





- Neurologic


Neurologic: no focal deficits, moves all extremities





- Psychiatric


Psychiatric: appropriate mood/affect





- Labs


CBC & Chem 7: 


                                 08/27/20 06:07





                                 08/27/20 06:07


Labs: 


                              Abnormal lab results











  08/27/20 Range/Units





  06:07 


 


RBC  3.14 L  (3.65-5.03)  M/mm3


 


Hgb  9.0 L  (10.1-14.3)  gm/dl


 


Hct  26.2 L D  (30.3-42.9)  %


 


RDW  16.3 H  (13.2-15.2)  %


 


Mono % (Auto)  8.6 H  (0.0-7.3)  %

## 2020-08-27 NOTE — PROGRESS NOTE
Assessment and Plan





hemoglobin 9 


creat 1.2 


awaiting ct with contrast 





will sched cysto for monday ?? exploration pending xray and surgical input 





Subjective


Date of service: 08/27/20


Principal diagnosis: Abdominal abscess, bladder fistula





Objective





- Constitutional


Vitals: 


                               Vital Signs - 12hr











  08/27/20 08/27/20 08/27/20





  03:08 04:16 08:40


 


Temperature  97.8 F 99.4 F


 


Pulse Rate  87 97 H


 


Respiratory 20 20 20





Rate   


 


Blood Pressure  184/112 179/109


 


O2 Sat by Pulse  98 97





Oximetry   











General appearance: Present: no acute distress





- Respiratory


Respiratory effort: normal





- Labs


CBC & Chem 7: 


                                 08/27/20 06:07





                                 08/27/20 06:07


Labs: 


                              Abnormal lab results











  08/27/20 Range/Units





  06:07 


 


RBC  3.14 L  (3.65-5.03)  M/mm3


 


Hgb  9.0 L  (10.1-14.3)  gm/dl


 


Hct  26.2 L D  (30.3-42.9)  %


 


RDW  16.3 H  (13.2-15.2)  %


 


Mono % (Auto)  8.6 H  (0.0-7.3)  %














Medications & Allergies





- Medications


Allergies/Adverse Reactions: 


                                    Allergies





No Known Allergies Allergy (Unverified 07/26/18 20:58)


   








Home Medications: 


                                Home Medications











 Medication  Instructions  Recorded  Confirmed  Last Taken  Type


 


Metoprolol [Lopressor TAB] 50 mg PO BID 07/26/18 08/07/20 07/26/18 History


 


Terazosin [Hytrin] 1 mg PO DAILY 07/26/18 08/07/20 Unknown History


 


cloNIDine [Catapres] 0.2 mg PO DAILY 07/26/18 08/07/20 07/26/18 History


 


lisinopriL [Zestril TAB] 40 mg PO QDAY 07/26/18 08/07/20 07/26/18 History


 


Fluticasone [Flonase] 1 spray NS QDAY #1 bottle 07/27/18 08/07/20 Unknown Rx


 


Amoxicillin/Potassium Clav 1 each PO BID #20 tablet 08/18/20  Unknown Rx





[Augmentin 875-125 Tablet]     











Active Medications: 














Generic Name Dose Route Start Last Admin





  Trade Name Freq  PRN Reason Stop Dose Admin


 


Acetaminophen  650 mg  08/26/20 16:35 





  Tylenol  PO  





  Q4H PRN  





  Pain MILD(1-3)/Fever >100.5/HA  


 


Amlodipine Besylate  5 mg  08/27/20 10:00  08/27/20 10:14





  Amlodipine  PO   5 mg





  QDAY ANKUSH   Administration


 


Clonidine HCl  0.1 mg  08/27/20 10:00 





  Catapres  PO  





  Q12HR ANKUSH  


 


Sodium Chloride  1,000 mls @ 125 mls/hr  08/26/20 16:45  08/27/20 06:15





  Nacl 0.9% 1000 Ml  IV   125 mls/hr





  AS DIRECT ANKUSH   Administration


 


Metoprolol Tartrate  50 mg  08/26/20 22:00  08/27/20 10:14





  Metoprolol  PO   50 mg





  BID ANKUSH   Administration


 


Morphine Sulfate  2 mg  08/26/20 16:35  08/27/20 10:31





  Morphine  IV   2 mg





  Q4H PRN   Administration





  Pain, Moderate (4-6)  


 


Ondansetron HCl  4 mg  08/26/20 16:35 





  Zofran  IV  





  Q8H PRN  





  Nausea And Vomiting  


 


Sodium Chloride  10 ml  08/26/20 16:35 





  Sodium Chloride Flush Syringe 10 Ml  IV  





  PRN PRN  





  LINE FLUSH

## 2020-08-27 NOTE — PROGRESS NOTE
Assessment and Plan





Patient examined.  The patient has sanguinous urine drainage coming from her 

drainage catheter.  She also has a significant amount of sanguinous urine coming

from her Gonsalves catheter.  Overnight, the patient urinated with sanguinous urine.

 The patient has not been able to undergo a CT with contrast.  She may require 

cystoscopy to determine the etiology of her small bleed.  Patient is stable at 

present.





Subjective


Date of service: 08/27/20


Principal diagnosis: Abdominal abscess, bladder fistula


Interval history: 





Patient with a history of prior presentation with a bladder with fistulous 

connection to an abscess.  At that visit, the patient underwent placement of a 

drain.  At home, the patient noticed there was a transition in the color of her 

drainage fluid to bright red.  She then presented to the hospital.  Patient 

underwent a noncontrasted CT scan of the abdomen which demonstrated minimal 

amount of air in the region of the drainage catheter but no significant 

hematoma.  Patient's hemoglobin is stable.





Objective





- Constitutional


Vitals: 


                               Vital Signs - 12hr











  08/26/20 08/26/20 08/26/20





  22:00 22:02 22:14


 


Temperature   


 


Pulse Rate  89 


 


Respiratory   18





Rate   


 


Respiratory 18  





Rate [Lower   





Abdomen]   


 


Blood Pressure  178/98 


 


O2 Sat by Pulse   





Oximetry   














  08/26/20 08/27/20 08/27/20





  23:24 03:08 04:16


 


Temperature 99.2 F  97.8 F


 


Pulse Rate 87  87


 


Respiratory 18 20 20





Rate   


 


Respiratory   





Rate [Lower   





Abdomen]   


 


Blood Pressure 166/104  184/112


 


O2 Sat by Pulse 94  98





Oximetry   














  08/27/20





  08:40


 


Temperature 99.4 F


 


Pulse Rate 97 H


 


Respiratory 20





Rate 


 


Respiratory 





Rate [Lower 





Abdomen] 


 


Blood Pressure 179/109


 


O2 Sat by Pulse 97





Oximetry 











General appearance: Present: no acute distress





- EENT


Eyes: EOM intact


ENT: hearing intact





- Neck


Neck: supple, normal ROM





- Respiratory


Respiratory effort: normal





- Breasts


Breasts: deferred


Extremities: no ischemia





- Gastrointestinal


General gastrointestinal: Present: soft, non-tender, other (Lower midline 

abdominal drainage catheter, Gonsalves in place)


Rectal Exam: deferred





- Genitourinary


Female genitourinary: deferred





- Psychiatric


Psychiatric: appropriate mood/affect, cooperative





- Labs


CBC & Chem 7: 


                                 08/27/20 06:07





                                 08/27/20 06:07


Labs: 


                              Abnormal lab results











  08/27/20 Range/Units





  06:07 


 


RBC  3.14 L  (3.65-5.03)  M/mm3


 


Hgb  9.0 L  (10.1-14.3)  gm/dl


 


Hct  26.2 L D  (30.3-42.9)  %


 


RDW  16.3 H  (13.2-15.2)  %


 


Mono % (Auto)  8.6 H  (0.0-7.3)  %














Medications & Allergies





- Medications


Allergies/Adverse Reactions: 


                                    Allergies





No Known Allergies Allergy (Unverified 07/26/18 20:58)


   








Home Medications: 


                                Home Medications











 Medication  Instructions  Recorded  Confirmed  Last Taken  Type


 


Metoprolol [Lopressor TAB] 50 mg PO BID 07/26/18 08/07/20 07/26/18 History


 


Terazosin [Hytrin] 1 mg PO DAILY 07/26/18 08/07/20 Unknown History


 


cloNIDine [Catapres] 0.2 mg PO DAILY 07/26/18 08/07/20 07/26/18 History


 


lisinopriL [Zestril TAB] 40 mg PO QDAY 07/26/18 08/07/20 07/26/18 History


 


Fluticasone [Flonase] 1 spray NS QDAY #1 bottle 07/27/18 08/07/20 Unknown Rx


 


Amoxicillin/Potassium Clav 1 each PO BID #20 tablet 08/18/20  Unknown Rx





[Augmentin 875-125 Tablet]     











Active Medications: 














Generic Name Dose Route Start Last Admin





  Trade Name Freq  PRN Reason Stop Dose Admin


 


Acetaminophen  650 mg  08/26/20 16:35 





  Tylenol  PO  





  Q4H PRN  





  Pain MILD(1-3)/Fever >100.5/HA  


 


Amlodipine Besylate  5 mg  08/27/20 10:00 





  Amlodipine  PO  





  QDAY ANKUSH  


 


Sodium Chloride  1,000 mls @ 125 mls/hr  08/26/20 16:45  08/27/20 06:15





  Nacl 0.9% 1000 Ml  IV   125 mls/hr





  AS DIRECT ANKUSH   Administration


 


Metoprolol Tartrate  50 mg  08/26/20 22:00  08/26/20 22:02





  Metoprolol  PO   50 mg





  BID ANKUSH   Administration


 


Morphine Sulfate  2 mg  08/26/20 16:35  08/27/20 03:08





  Morphine  IV   2 mg





  Q4H PRN   Administration





  Pain, Moderate (4-6)  


 


Ondansetron HCl  4 mg  08/26/20 16:35 





  Zofran  IV  





  Q8H PRN  





  Nausea And Vomiting  


 


Sodium Chloride  10 ml  08/26/20 16:35 





  Sodium Chloride Flush Syringe 10 Ml  IV  





  PRN PRN  





  LINE FLUSH

## 2020-08-27 NOTE — CAT SCAN REPORT
CT ABDOMEN AND PELVIS WITH AND WITHOUT CONTRAST



INDICATION: Hematuria



CONTRAST: With and without 100 cc Omnipaque 350 IV, multiphase



COMPARISON: 8/26/2020, 8/11/2020



All CT scans at this location are performed using CT dose reduction for ALARA by means of automated e
xposure control. 



FINDINGS: Mild basilar atelectatic changes are seen. Slight bilateral pleural effusions are noted. No
 pneumoperitoneum is seen. Liver is moderately enlarged and has a length of 21.8 cm. Fatty infiltrati
on is noted. No focal lesions are obvious. Spleen is not enlarged. Heterogenous appearance of the spl
een is unchanged and has been previously described. Gallbladder has been removed. No biliary dilatati
on is seen. No lymphadenopathy is noted. Left adnexal cystic mass is again seen. No free fluid is see
n. No evidence of bowel obstruction is noted. Colonic diverticulosis is again seen without definite e
vidence of diverticulitis.



Drain is still seen in the fluid and gas collection in the left lateral lower abdomen and upper pelvi
s. This collection has mildly more fluid now and is mildly enlarged. Previous transverse diameter of 
the fluid was 10 mm and today measures 15 mm. This collection appears to extend in its inferior aspec
t a short distance just inside the anterior pelvic wall laterally similar to prior study. The suprapu
bic pigtail catheter remains in the mottled heterogenous collection of thick fluid and gas in the ant
erior mid to lower pelvis anterior to the urinary bladder. This collection is not significantly landry
ed in appearance from yesterday study and has a maximal transverse diameter of approximately 7 cm. No
 new collections are seen. Gonsalves catheter decompresses the bladder fully on initial images.



No urinary tract calculi or evidence of obstruction are seen. Minimal calcifications in both renal hi
la appear to be vascular. No renal masses are seen. No ureteral abnormalities are noted. On delayed i
mages contrast is seen in the urinary bladder extending through the known defect and moderately filli
ng the abscess cavity in the anterior mid to lower pelvis as just described. The defect is again seen
 in the left lateral aspect of the bladder wall. I do not see free extravasation of contrast into the
 pelvis. No new collections are seen.





IMPRESSION: 

1. The 2 known collections with drains are noted as above with direct connection of the lower anterio
r pelvic collection with the urinary bladder. The anterior pelvic collection is similar in size to ye
sterday's study but the lateral lower abdominal and upper pelvic collection is mildly increased in si
ze. Catheters were appear to remain in good position at both sites however.

2. No additional source of hematuria is identified. No upper urinary source is seen.





Signer Name: Oren Muller MD 

Signed: 8/27/2020 8:05 PM

Workstation Name: [email or web address]

## 2020-08-28 RX ADMIN — OXYCODONE AND ACETAMINOPHEN PRN TAB: 5; 325 TABLET ORAL at 12:42

## 2020-08-28 RX ADMIN — METOPROLOL TARTRATE SCH MG: 50 TABLET, FILM COATED ORAL at 09:02

## 2020-08-28 RX ADMIN — LISINOPRIL SCH MG: 40 TABLET ORAL at 10:31

## 2020-08-28 RX ADMIN — OXYCODONE AND ACETAMINOPHEN PRN TAB: 5; 325 TABLET ORAL at 18:51

## 2020-08-28 RX ADMIN — ACETAMINOPHEN PRN MG: 325 TABLET ORAL at 17:54

## 2020-08-28 RX ADMIN — METOPROLOL TARTRATE SCH MG: 50 TABLET, FILM COATED ORAL at 21:24

## 2020-08-28 RX ADMIN — MORPHINE SULFATE PRN MG: 2 INJECTION, SOLUTION INTRAMUSCULAR; INTRAVENOUS at 10:28

## 2020-08-28 RX ADMIN — SODIUM CHLORIDE SCH MLS/HR: 0.9 INJECTION, SOLUTION INTRAVENOUS at 16:27

## 2020-08-28 RX ADMIN — SODIUM CHLORIDE SCH MLS/HR: 0.9 INJECTION, SOLUTION INTRAVENOUS at 06:31

## 2020-08-28 NOTE — PROGRESS NOTE
Assessment and Plan





- Patient Problems


(1) Hypertension


Current Visit: Yes   Status: Chronic   


Qualifiers: 


   Hypertension type: essential hypertension   Qualified Code(s): I10 - 

Essential (primary) hypertension   


Plan to address problem: 


poorly controlled


Increase. clonidine 0.2 mg BID. cont. amlodipine and BB


Continue lisinopril 40 mg daily


cont. IV hydralazine prn








(2) Hypercalcemia


Current Visit: Yes   Status: Acute   


Plan to address problem: 


improved with IVF








(3) FARHAN (acute kidney injury)


Current Visit: Yes   Status: Acute   





(4) Abdominal pain


Current Visit: Yes   Status: Chronic   


Qualifiers: 


   Abdominal location: left lower quadrant   Qualified Code(s): R10.32 - Left 

lower quadrant pain   


Plan to address problem: 


Secondary to perforated sigmoid diverticulitis


Status post recent surgery


The pain is mild and states that the pain is slightly increased  today








(5) Hematuria


Current Visit: Yes   Status: Acute   


Qualifiers: 


   Glomerular morphologic changes: unspecified whether glomerular morphologic 

changes present 


Plan to address problem: 


Although patient has gross hematuria her hemoglobin and hematocrit appears 

stable, and unchanged since last discharge


Checked drainage bag and urinary catheter bag, has serosanguineous urine which 

is not grossly bloody


Urology note reviewed.  Scheduled for cystoscopy on Monday











(6) Perforation of sigmoid colon due to diverticulitis


Current Visit: No   Status: Acute   


Plan to address problem: 


Status post surgery 3 weeks ago


Surgery consulted and note reviewed and appreciated 


no antibiotics at this time per surgery recommendations











Subjective


Date of service: 08/28/20


Principal diagnosis: Abdominal abscess, bladder fistula


Interval history: 





40 yo AA female with hx of complicated sigmoid diverticulitis admitted in early 

August and underwent diagnostic laparoscopy and drain placement and also had a 

suprapubic catheter placed, presents to the ED with complaints of gross 

hematuria through the  suprapubic catheter as well as through the JULIANO drain .


Patient was admitted for further evaluation of gross hematuria and blood through

the JULIANO drain





8/27 patient is alert and oriented, feels better, still has pain in the left mid

abdomen but states it is better


She denies any nausea or vomiting, denies fever chills


Vascular, general surgery and urology notes reviewed


Lab results reviewed





8/28 no specific complaints except pain in the left mid to lower abdomen


Blood pressure moderately elevated


Scheduled for cystoscopy on Monday





Objective





- Constitutional


Vitals: 


                               Vital Signs - 12hr











  08/27/20 08/27/20 08/27/20





  21:37 21:40 21:42


 


Temperature 98.7 F  


 


Pulse Rate   83


 


Respiratory 18 20 





Rate   


 


Blood Pressure 186/127  177/101


 


O2 Sat by Pulse   





Oximetry   














  08/28/20 08/28/20 08/28/20





  01:17 05:24 08:10


 


Temperature  99.1 F 99.0 F


 


Pulse Rate 81 87 92 H


 


Respiratory  18 20





Rate   


 


Blood Pressure 186/127 183/114 211/136


 


O2 Sat by Pulse  99 100





Oximetry   














  08/28/20 08/28/20





  09:01 09:02


 


Temperature  


 


Pulse Rate 87 87


 


Respiratory  





Rate  


 


Blood Pressure 183/114 183/114


 


O2 Sat by Pulse  





Oximetry  











General appearance: Present: no acute distress





- EENT


Eyes: PERRL, EOM intact


ENT: hearing intact, clear oral mucosa





- Neck


Neck: supple, normal ROM





- Respiratory


Respiratory effort: normal


Respiratory: bilateral: CTA





- Cardiovascular


Rhythm: regular


Heart Sounds: Present: S1 & S2


Extremities: No edema





- Gastrointestinal


General gastrointestinal: Present: soft, tender


Localized gastrointestinal: tender: LLQ (mild)


Rectal Exam: deferred





- Integumentary


Integumentary: clear





- Musculoskeletal


Musculoskeletal: strength equal bilaterally





- Neurologic


Neurologic: no focal deficits, moves all extremities





- Psychiatric


Psychiatric: appropriate mood/affect





- Labs


CBC & Chem 7: 


                                 08/27/20 06:07





                                 08/27/20 06:07

## 2020-08-28 NOTE — PROGRESS NOTE
Assessment and Plan





- Patient Problems


(1) Abdominal pain


Current Visit: Yes   Status: Chronic   


Qualifiers: 


   Abdominal location: left lower quadrant   Qualified Code(s): R10.32 - Left 

lower quadrant pain   


Plan to address problem: 


Pt stable.  As the case with Dr. Salazar.  The plan at this point is to do a 

cystoscopy on Monday.  I will be present in the operating room should the need 

arise for an open bladder repair.  If there is bowel that is involved, then I 

will handle that portion.  This was explained to the patient.  I will consent 

her tomorrow for an exploratory laparotomy.  I will do a slow bowel prep over 

the next few days in anticipation of a possible exploratory laparotomy.





Review of the CT scan of the left flank shows a small accumulation of fluid.  As

I flushed the drain today I did not get back any significant amount of fluid.  

It makes me think that the fluid collection that is seen on CT may be walled 

off.  We may have to drain that percutaneously in the future.





We will follow along.  Please call with any questions.





Time=10min














Subjective


Date of service: 08/28/20


Patient Reports: Positive: feels better, tolerating a regular diet





Objective


                               Vital Signs - 12hr











  08/28/20 08/28/20 08/28/20





  01:17 05:24 08:10


 


Temperature  99.1 F 99.0 F


 


Pulse Rate 81 87 92 H


 


Pulse Rate [   





Right Radial]   


 


Respiratory  18 20





Rate   


 


Blood Pressure 186/127 183/114 211/136


 


O2 Sat by Pulse  99 100





Oximetry   














  08/28/20 08/28/20 08/28/20





  09:01 09:02 10:00


 


Temperature   


 


Pulse Rate 87 87 


 


Pulse Rate [   72





Right Radial]   


 


Respiratory   18





Rate   


 


Blood Pressure 183/114 183/114 


 


O2 Sat by Pulse   





Oximetry   














  08/28/20





  10:31


 


Temperature 


 


Pulse Rate 74


 


Pulse Rate [ 





Right Radial] 


 


Respiratory 





Rate 


 


Blood Pressure 170/110


 


O2 Sat by Pulse 





Oximetry 














- General physical appearance


no distress, no pain, obese, other (looks better)





- Respiratory


normal expansion, normal respiratory effort





- Abdomen


soft, other (JULIANO drain with minimal output. Drain flushed easily)





- Psychiatric


oriented to time, oriented to person, oriented to place, speech is normal, 

memory intact





- Labs





                                 08/27/20 06:07





                                 08/27/20 06:07

## 2020-08-29 RX ADMIN — METOPROLOL TARTRATE SCH MG: 50 TABLET, FILM COATED ORAL at 21:14

## 2020-08-29 RX ADMIN — LISINOPRIL SCH MG: 40 TABLET ORAL at 10:46

## 2020-08-29 RX ADMIN — METOPROLOL TARTRATE SCH MG: 50 TABLET, FILM COATED ORAL at 10:45

## 2020-08-29 RX ADMIN — SODIUM CHLORIDE SCH MLS/HR: 0.9 INJECTION, SOLUTION INTRAVENOUS at 00:53

## 2020-08-29 RX ADMIN — OXYCODONE AND ACETAMINOPHEN PRN TAB: 5; 325 TABLET ORAL at 08:21

## 2020-08-29 RX ADMIN — OXYCODONE AND ACETAMINOPHEN PRN TAB: 5; 325 TABLET ORAL at 20:35

## 2020-08-29 RX ADMIN — OXYCODONE AND ACETAMINOPHEN PRN TAB: 5; 325 TABLET ORAL at 00:55

## 2020-08-29 RX ADMIN — SODIUM CHLORIDE SCH MLS/HR: 0.9 INJECTION, SOLUTION INTRAVENOUS at 08:22

## 2020-08-29 RX ADMIN — SODIUM CHLORIDE SCH MLS/HR: 0.9 INJECTION, SOLUTION INTRAVENOUS at 15:45

## 2020-08-29 RX ADMIN — OXYCODONE AND ACETAMINOPHEN PRN TAB: 5; 325 TABLET ORAL at 14:40

## 2020-08-29 NOTE — PROGRESS NOTE
Assessment and Plan





- Patient Problems


(1) Abdominal pain


Current Visit: Yes   Status: Chronic   


Qualifiers: 


   Abdominal location: left lower quadrant   Qualified Code(s): R10.32 - Left 

lower quadrant pain   


Plan to address problem: 


Pt stable.  As the case with Dr. Salazar.  The plan at this point is to do a 

cystoscopy on Monday.  I will be present in the operating room should the need 

arise for an open bladder repair.  If there is bowel that is involved, then I 

will handle that portion.  This was explained to the patient.  Seizure, risk, 

benefits were discussed.  All questions were answered.  Consent was obtained.





We will continue with a slow bowel prep over the weekend in anticipation of a 

possible exploratory laparotomy. I will place her on a clear liquid diet 

tomorrow and then NPO after midnight. 





We will follow along.  Please call with any questions.





Time=10min














Subjective


Date of service: 08/29/20


Patient Reports: Positive: no new complaints, feels better





Objective


                               Vital Signs - 12hr











  08/28/20 08/29/20 08/29/20





  23:39 00:00 04:16


 


Temperature 99.0 F  98.0 F


 


Pulse Rate 88 85 82


 


Respiratory 18  18





Rate   


 


Blood Pressure 161/87  185/111


 


O2 Sat by Pulse 98  98





Oximetry   














  08/29/20 08/29/20





  05:35 09:29


 


Temperature  99.5 F


 


Pulse Rate  84


 


Respiratory  18





Rate  


 


Blood Pressure 185/111 177/99


 


O2 Sat by Pulse  100





Oximetry  














- General physical appearance


no distress, no pain, obese, other (looks more comfortable)





- Respiratory


normal expansion, normal respiratory effort





- Abdomen


soft, other (Drains about the same)





- Integumentary


no rash, no growths, no abnormal pigmentation





- Psychiatric


oriented to time, oriented to person, oriented to place, speech is normal, 

memory intact





- Labs





                                 08/27/20 06:07





                                 08/27/20 06:07

## 2020-08-29 NOTE — PROGRESS NOTE
Assessment and Plan





-- Abdominal pain


Secondary to perforated sigmoid diverticulitis


Status post recent surgery during prior admission


GS following





-- Hematuria


Although patient has gross hematuria


her hemoglobin and hematocrit appears stable, and unchanged since last discharge


Checked drainage bag and urinary catheter bag, has serosanguineous urine which 

is not grossly bloody


Urology note reviewed.  Scheduled for cystoscopy on Monday





-- Perforation of sigmoid colon due to diverticulitis


Status post surgery 3 weeks ago


Surgery consulted and note reviewed and appreciated 


no antibiotics at this time per surgery recommendations





-- Hypertension


poorly controlled


Increase. clonidine 0.2 mg BID. cont. amlodipine and BB


Continue lisinopril 40 mg daily


cont. IV hydralazine prn





-- Hypercalcemia


improved with IVF, monitor BMP





-- FARHAN (acute kidney injury)


Continue IV fluid, monitor BMP





--DVT prophylaxis


--Full CODE STATUS





Brief history: 40 yo AA female with hx of complicated sigmoid diverticulitis 

admitted in early August and underwent diagnostic laparoscopy and drain 

placement and also had a suprapubic catheter placed, presents to the ED with 

complaints of gross hematuria through the  suprapubic catheter as well as t

hrough the JULIANO drain . Patient was admitted for further evaluation of gross 

hematuria and blood through the JULIANO drain





8/27 patient is alert and oriented, feels better, still has pain in the left mid

abdomen but states it is better. She denies any nausea or vomiting, denies fever

chills. Vascular, general surgery and urology notes reviewed. Lab results 

reviewed





8/28 no specific complaints except pain in the left mid to lower abdomen, Blood 

pressure moderately elevated. Scheduled for cystoscopy on Monday 8/29: Plan for cystoscopy on Monday, continue supportive care and current 

management.  Noted urology and general surgery recommendation.











Subjective


Date of service: 08/29/20


Principal diagnosis: Abdominal abscess, bladder fistula


Interval history: 





Patient seen and examined.  Medical records and medication list reviewed.  


No acute event overnight noted by the RN.  


Patient denies any chest pain or difficulty breathing.  Patient is tolerating 

diet.  


Discussed plan of care at bedside with patient.








Objective





- Exam


Narrative Exam: 





General appearance: Present: no acute distress





- EENT


Eyes: PERRL, EOM intact


ENT: hearing intact, clear oral mucosa





- Neck


Neck: supple, normal ROM





- Respiratory


Respiratory effort: normal


Respiratory: bilateral: CTA





- Cardiovascular


Rhythm: regular


Heart Sounds: Present: S1 & S2


Extremities: No edema





- Gastrointestinal


General gastrointestinal: Present: soft, tender


Localized gastrointestinal: tender: LLQ (mild)


Rectal Exam: deferred





- Integumentary


Integumentary: clear





- Musculoskeletal


Musculoskeletal: strength equal bilaterally





- Neurologic


Neurologic: no focal deficits, moves all extremities





- Psychiatric


Psychiatric: appropriate mood/affect











- Constitutional


Vitals: 


                               Vital Signs - 12hr











  08/29/20 08/29/20 08/29/20





  04:16 05:35 09:29


 


Temperature 98.0 F  99.5 F


 


Pulse Rate 82  84


 


Respiratory 18  18





Rate   


 


Blood Pressure 185/111 185/111 177/99


 


O2 Sat by Pulse 98  100





Oximetry   














  08/29/20 08/29/20





  12:07 12:14


 


Temperature 99.9 F H 


 


Pulse Rate 85 88


 


Respiratory 18 





Rate  


 


Blood Pressure 195/112 


 


O2 Sat by Pulse 97 





Oximetry  














- Labs


CBC & Chem 7: 


                                 08/27/20 06:07





                                 08/27/20 06:07

## 2020-08-29 NOTE — PROGRESS NOTE
Assessment and Plan





spoke with the nurse 


no acute issues 


urine clearing 


cysto monday likely open repair 





Subjective


Date of service: 08/29/20


Principal diagnosis: Abdominal abscess, bladder fistula





Objective





- Constitutional


Vitals: 


                               Vital Signs - 12hr











  08/29/20 08/29/20 08/29/20





  04:16 05:35 09:29


 


Temperature 98.0 F  99.5 F


 


Pulse Rate 82  84


 


Respiratory 18  18





Rate   


 


Blood Pressure 185/111 185/111 177/99


 


O2 Sat by Pulse 98  100





Oximetry   














  08/29/20 08/29/20





  12:07 12:14


 


Temperature 99.9 F H 


 


Pulse Rate 85 88


 


Respiratory 18 





Rate  


 


Blood Pressure 195/112 


 


O2 Sat by Pulse 97 





Oximetry  











General appearance: Present: no acute distress





- Labs


CBC & Chem 7: 


                                 08/27/20 06:07





                                 08/27/20 06:07





Medications & Allergies





- Medications


Allergies/Adverse Reactions: 


                                    Allergies





No Known Allergies Allergy (Unverified 07/26/18 20:58)


   








Home Medications: 


                                Home Medications











 Medication  Instructions  Recorded  Confirmed  Last Taken  Type


 


Metoprolol [Lopressor TAB] 50 mg PO BID 07/26/18 08/29/20 07/26/18 History


 


Terazosin [Hytrin] 1 mg PO DAILY 07/26/18 08/29/20 Unknown History


 


cloNIDine [Catapres] 0.2 mg PO DAILY 07/26/18 08/29/20 07/26/18 History


 


lisinopriL [Zestril TAB] 40 mg PO QDAY 07/26/18 08/29/20 07/26/18 History


 


Fluticasone [Flonase] 1 spray NS QDAY #1 bottle 07/27/18 08/29/20 Unknown Rx


 


Amoxicillin/Potassium Clav 1 each PO BID #20 tablet 08/18/20 08/29/20 Unknown Rx





[Augmentin 875-125 Tablet]     











Active Medications: 














Generic Name Dose Route Start Last Admin





  Trade Name Freq  PRN Reason Stop Dose Admin


 


Acetaminophen  650 mg  08/26/20 16:35  08/27/20 13:38





  Tylenol  PO   650 mg





  Q4H PRN   Administration





  Pain MILD(1-3)/Fever >100.5/HA  


 


Amlodipine Besylate  10 mg  08/29/20 14:44 





  Amlodipine  PO  





  QDAY ANKUSH  


 


Clonidine HCl  0.2 mg  08/28/20 18:06  08/29/20 10:46





  Catapres  PO   0.2 mg





  Q12HR ANKUSH   Administration


 


Sodium Chloride  1,000 mls @ 125 mls/hr  08/26/20 16:45  08/29/20 08:22





  Nacl 0.9% 1000 Ml  IV   125 mls/hr





  AS DIRECT ANKUSH   Administration


 


Labetalol HCl  10 mg  08/29/20 14:44 





  Labetalol  IV  





  Q4H PRN  





  Hypertension  


 


Lisinopril  40 mg  08/28/20 10:00  08/29/20 10:46





  Zestril  PO   40 mg





  QDAY ANKUSH   Administration


 


Metoprolol Tartrate  100 mg  08/29/20 14:45 





  Metoprolol  PO  





  BID ANKUSH  


 


Morphine Sulfate  2 mg  08/26/20 16:35  08/28/20 10:28





  Morphine  IV   2 mg





  Q4H PRN   Administration





  Pain, Moderate (4-6)  


 


Ondansetron HCl  4 mg  08/26/20 16:35 





  Zofran  IV  





  Q8H PRN  





  Nausea And Vomiting  


 


Oxycodone/Acetaminophen  2 tab  08/28/20 11:59  08/29/20 14:40





  Percocet 5/325  PO   2 tab





  Q6H PRN   Administration





  Pain, Moderate (4-6)  


 


Sodium Chloride  10 ml  08/26/20 16:35  08/27/20 21:44





  Sodium Chloride Flush Syringe 10 Ml  IV   10 ml





  PRN PRN   Administration





  LINE FLUSH

## 2020-08-30 LAB
ANISOCYTOSIS BLD QL SMEAR: (no result)
BAND NEUTROPHILS # (MANUAL): 0 K/MM3
BASOPHILS # (AUTO): 0 K/MM3 (ref 0–0.1)
BUN SERPL-MCNC: 8 MG/DL (ref 7–17)
BUN/CREAT SERPL: 7 %
CALCIUM SERPL-MCNC: 9.1 MG/DL (ref 8.4–10.2)
EOSINOPHIL # BLD AUTO: 0 K/MM3 (ref 0–0.4)
EOSINOPHIL NFR BLD AUTO: 0 % (ref 0–4.3)
HCT VFR BLD CALC: 25.6 % (ref 30.3–42.9)
HCT VFR BLD CALC: 28.3 % (ref 30.3–42.9)
HEMOLYSIS INDEX: 0
HGB BLD-MCNC: 8.6 GM/DL (ref 10.1–14.3)
HGB BLD-MCNC: 9.5 GM/DL (ref 10.1–14.3)
MCHC RBC AUTO-ENTMCNC: 33 % (ref 30–34)
MCV RBC AUTO: 83 FL (ref 79–97)
MONOCYTES # (AUTO): 1.5 K/MM3 (ref 0–0.8)
MONOCYTES % (AUTO): 6.6 % (ref 0–7.3)
MYELOCYTES # (MANUAL): 0 K/MM3
PLATELET # BLD: 228 K/MM3 (ref 140–440)
PROMYELOCYTES # (MANUAL): 0 K/MM3
RBC # BLD AUTO: 3.08 M/MM3 (ref 3.65–5.03)
TOTAL CELLS COUNTED BLD: 200

## 2020-08-30 RX ADMIN — ACETAMINOPHEN PRN MG: 325 TABLET ORAL at 00:23

## 2020-08-30 RX ADMIN — SODIUM CHLORIDE SCH MLS/HR: 0.9 INJECTION, SOLUTION INTRAVENOUS at 20:00

## 2020-08-30 RX ADMIN — ACETAMINOPHEN PRN MG: 325 TABLET ORAL at 17:23

## 2020-08-30 RX ADMIN — METOPROLOL TARTRATE SCH MG: 50 TABLET, FILM COATED ORAL at 21:52

## 2020-08-30 RX ADMIN — Medication PRN ML: at 21:55

## 2020-08-30 RX ADMIN — SODIUM CHLORIDE SCH MLS/HR: 0.9 INJECTION, SOLUTION INTRAVENOUS at 08:45

## 2020-08-30 RX ADMIN — OXYCODONE AND ACETAMINOPHEN PRN TAB: 5; 325 TABLET ORAL at 08:29

## 2020-08-30 RX ADMIN — LISINOPRIL SCH MG: 40 TABLET ORAL at 10:27

## 2020-08-30 RX ADMIN — SODIUM CHLORIDE SCH MLS/HR: 0.9 INJECTION, SOLUTION INTRAVENOUS at 00:23

## 2020-08-30 RX ADMIN — METOPROLOL TARTRATE SCH MG: 50 TABLET, FILM COATED ORAL at 10:27

## 2020-08-30 RX ADMIN — OXYCODONE AND ACETAMINOPHEN PRN TAB: 5; 325 TABLET ORAL at 14:32

## 2020-08-30 RX ADMIN — MORPHINE SULFATE PRN MG: 2 INJECTION, SOLUTION INTRAMUSCULAR; INTRAVENOUS at 21:50

## 2020-08-30 RX ADMIN — OXYCODONE AND ACETAMINOPHEN PRN TAB: 5; 325 TABLET ORAL at 03:06

## 2020-08-30 NOTE — PROGRESS NOTE
Assessment and Plan





- Patient Problems


(1) Perforation of sigmoid colon due to diverticulitis


Current Visit: No   Status: Acute   


Plan to address problem: 


Surgery team consulted, IV antibiotic therapy, due to patient fever to 103.1 the

patient will be treated with empiric vancomycin x1 dose, blood cultures, 

supportive care.  Patient is pending surgical exploration in a.m.








(2) Obesity (BMI 30.0-34.9)


Current Visit: No   Status: Acute   


Plan to address problem: 


Balanced diet, increase physical activity at discharge.








(3) FARHAN (acute kidney injury)


Current Visit: Yes   Status: Acute   


Plan to address problem: 


Resolved, IV fluid resuscitation therapy, supportive care.








(4) DVT prophylaxis


Current Visit: Yes   Status: Acute   


Plan to address problem: 


SCD to bilateral lower extremities while in bed, patient is ambulatory.








History


Interval history: 


38 YO Female HD #4 with Diverticulitis complicated by Perforated Viscus, FARHAN, 

with recurrent abdominal pain and hematuria suspected bladder injury and pending

surgical intervention in AM. Pt currently undergoing bowel prep.  As per nursing

staff the patient spiked a fever to 103.1 F today.  Blood cultures ordered.  

Patient treated empirically with IV vancomycin x1 dose.  Patient resting 

comfortably in bed.  Patient denies pain.








Hospitalist Physical





- Constitutional


Vitals: 


                                        











Temp Pulse Resp BP Pulse Ox


 


 102.2 F H  103 H  20   162/97   96 


 


 08/30/20 16:03  08/30/20 16:03  08/30/20 16:03  08/30/20 16:03  08/30/20 16:03











General appearance: Present: no acute distress





- EENT


Eyes: Present: PERRL, EOM intact


ENT: hearing intact





- Neck


Neck: Present: supple





- Respiratory


Respiratory: bilateral: CTA





- Cardiovascular


Rhythm: regular


Heart Sounds: Present: S1 & S2





- Extremities


Extremities: no ischemia


Peripheral Pulses: within normal limits





- Abdominal


General gastrointestinal: soft, non-distended, normal bowel sounds





- Integumentary


Integumentary: Present: clear, dry





- Psychiatric


Psychiatric: appropriate mood/affect, intact judgment & insight, memory intact, 

cooperative





- Neurologic


Neurologic: CNII-XII intact





Results





- Labs


CBC & Chem 7: 


                                 08/30/20 18:24





                                 08/30/20 05:29


Labs: 


                             Laboratory Last Values











WBC  8.0 K/mm3 (4.5-11.0)   08/27/20  06:07    


 


RBC  3.14 M/mm3 (3.65-5.03)  L  08/27/20  06:07    


 


Hgb  9.5 gm/dl (10.1-14.3)  L  08/30/20  05:29    


 


Hct  28.3 % (30.3-42.9)  L  08/30/20  05:29    


 


MCV  84 fl (79-97)   08/27/20  06:07    


 


MCH  29 pg (28-32)   08/27/20  06:07    


 


MCHC  34 % (30-34)   08/27/20  06:07    


 


RDW  16.3 % (13.2-15.2)  H  08/27/20  06:07    


 


Plt Count  407 K/mm3 (140-440)   08/27/20  06:07    


 


Lymph % (Auto)  18.5 % (13.4-35.0)   08/27/20  06:07    


 


Mono % (Auto)  8.6 % (0.0-7.3)  H  08/27/20  06:07    


 


Eos % (Auto)  3.3 % (0.0-4.3)   08/27/20  06:07    


 


Baso % (Auto)  0.6 % (0.0-1.8)   08/27/20  06:07    


 


Lymph #  1.5 K/mm3 (1.2-5.4)   08/27/20  06:07    


 


Mono #  0.7 K/mm3 (0.0-0.8)   08/27/20  06:07    


 


Eos #  0.3 K/mm3 (0.0-0.4)   08/27/20  06:07    


 


Baso #  0.1 K/mm3 (0.0-0.1)   08/27/20  06:07    


 


Seg Neutrophils %  69.0 % (40.0-70.0)   08/27/20  06:07    


 


Seg Neutrophils #  5.5 K/mm3 (1.8-7.7)   08/27/20  06:07    


 


Sodium  140 mmol/L (137-145)   08/30/20  05:29    


 


Potassium  3.6 mmol/L (3.6-5.0)   08/30/20  05:29    


 


Chloride  102.5 mmol/L ()   08/30/20  05:29    


 


Carbon Dioxide  21 mmol/L (22-30)  L  08/30/20  05:29    


 


Anion Gap  20 mmol/L  08/30/20  05:29    


 


BUN  8 mg/dL (7-17)   08/30/20  05:29    


 


Creatinine  1.2 mg/dL (0.6-1.2)   08/30/20  05:29    


 


Estimated GFR  > 60 ml/min  08/30/20  05:29    


 


BUN/Creatinine Ratio  7 %  08/30/20  05:29    


 


Glucose  90 mg/dL ()   08/30/20  05:29    


 


POC Glucose  87  ()   08/29/20  15:47    


 


Calcium  9.1 mg/dL (8.4-10.2)   08/30/20  05:29    











Microbiology: 


Microbiology





08/30/20 15:31   Peripheral/Venous   Blood Culture - Preliminary


                            Culture in Progress


08/30/20 15:31   Peripheral/Venous   Blood Culture - Preliminary


                            Culture in Progress


08/28/20 Unknown   Urine,Clean Catch   Urine Culture - Preliminary


                                Gram Negative Wild








Gonsalves/IV: 


                                        





Voiding Method                   Indwelling Catheter


IV Catheter Type [Left           Peripheral IV


Antecubital]                     


IV Catheter Type [Right Wrist]   Peripheral IV











Active Medications





- Current Medications


Current Medications: 














Generic Name Dose Route Start Last Admin





  Trade Name Freq  PRN Reason Stop Dose Admin


 


Acetaminophen  650 mg  08/26/20 16:35  08/30/20 17:23





  Tylenol  PO   650 mg





  Q4H PRN   Administration





  Pain MILD(1-3)/Fever >100.5/HA  


 


Amlodipine Besylate  10 mg  08/29/20 14:44  08/30/20 10:28





  Amlodipine  PO   10 mg





  QDAY ANKUSH   Administration


 


Clonidine HCl  0.2 mg  08/28/20 18:06  08/30/20 10:27





  Catapres  PO   0.2 mg





  Q12HR ANKUSH   Administration


 


Sodium Chloride  1,000 mls @ 125 mls/hr  08/26/20 16:45  08/30/20 08:45





  Nacl 0.9% 1000 Ml  IV   125 mls/hr





  AS DIRECT ANKUSH   Administration


 


Levofloxacin/Dextrose  750 mg in 150 mls @ 100 mls/hr  08/30/20 10:00  08/30/20 

10:28





  Levaquin 750mg/150ml  IV   100 mls/hr





  Q24HR ANKUSH   Administration





  Protocol  


 


Vancomycin HCl 1,250 mg/  275 mls @ 166.667 mls/hr  08/31/20 04:00 





  Sodium Chloride  IV  





  Q12H ANKUSH  


 


Labetalol HCl  10 mg  08/29/20 14:44  08/30/20 08:40





  Labetalol  IV   10 mg





  Q4H PRN   Administration





  Hypertension  


 


Lisinopril  40 mg  08/28/20 10:00  08/30/20 10:27





  Zestril  PO   40 mg





  QDAY ANKUSH   Administration


 


Metoprolol Tartrate  100 mg  08/29/20 14:45  08/30/20 10:27





  Metoprolol  PO   100 mg





  BID ANKUSH   Administration


 


Morphine Sulfate  2 mg  08/26/20 16:35  08/28/20 10:28





  Morphine  IV   2 mg





  Q4H PRN   Administration





  Pain, Moderate (4-6)  


 


Ondansetron HCl  4 mg  08/26/20 16:35  08/30/20 14:59





  Zofran  IV   4 mg





  Q8H PRN   Administration





  Nausea And Vomiting  


 


Oxycodone/Acetaminophen  2 tab  08/28/20 11:59  08/30/20 14:32





  Percocet 5/325  PO   2 tab





  Q6H PRN   Administration





  Pain, Moderate (4-6)  


 


Polyethylene Glycol/Electrolytes  4,000 ml  08/30/20 17:00  08/30/20 17:19





  Golytely  PO   4,000 ml





  ONCE ANKUSH   Administration


 


Sodium Chloride  10 ml  08/26/20 16:35  08/27/20 21:44





  Sodium Chloride Flush Syringe 10 Ml  IV   10 ml





  PRN PRN   Administration





  LINE FLUSH

## 2020-08-30 NOTE — EVENT NOTE
Date: 08/30/20





Fevers and positive urine culture noted. Levaquin started today.





Final part of bowel prep ordered as well as clear liquid diet. Pt aware. 





NPO after midnight. To OR tomorrow.

## 2020-08-31 LAB
HCT VFR BLD CALC: 24.1 % (ref 30.3–42.9)
HGB BLD-MCNC: 8.2 GM/DL (ref 10.1–14.3)

## 2020-08-31 PROCEDURE — 0TQB8ZZ REPAIR BLADDER, VIA NATURAL OR ARTIFICIAL OPENING ENDOSCOPIC: ICD-10-PCS | Performed by: UROLOGY

## 2020-08-31 PROCEDURE — 0TBB8ZX EXCISION OF BLADDER, VIA NATURAL OR ARTIFICIAL OPENING ENDOSCOPIC, DIAGNOSTIC: ICD-10-PCS | Performed by: UROLOGY

## 2020-08-31 PROCEDURE — 0D9N0ZX DRAINAGE OF SIGMOID COLON, OPEN APPROACH, DIAGNOSTIC: ICD-10-PCS | Performed by: UROLOGY

## 2020-08-31 RX ADMIN — HYDROMORPHONE HYDROCHLORIDE PRN MG: 1 INJECTION, SOLUTION INTRAMUSCULAR; INTRAVENOUS; SUBCUTANEOUS at 14:30

## 2020-08-31 RX ADMIN — MORPHINE SULFATE PRN MG: 2 INJECTION, SOLUTION INTRAMUSCULAR; INTRAVENOUS at 06:53

## 2020-08-31 RX ADMIN — METOPROLOL TARTRATE SCH MG: 50 TABLET, FILM COATED ORAL at 22:29

## 2020-08-31 RX ADMIN — MORPHINE SULFATE PRN MG: 2 INJECTION, SOLUTION INTRAMUSCULAR; INTRAVENOUS at 15:55

## 2020-08-31 RX ADMIN — OXYCODONE AND ACETAMINOPHEN PRN TAB: 5; 325 TABLET ORAL at 22:30

## 2020-08-31 RX ADMIN — HYDROMORPHONE HYDROCHLORIDE PRN MG: 1 INJECTION, SOLUTION INTRAMUSCULAR; INTRAVENOUS; SUBCUTANEOUS at 14:49

## 2020-08-31 RX ADMIN — OXYCODONE AND ACETAMINOPHEN PRN TAB: 5; 325 TABLET ORAL at 09:27

## 2020-08-31 RX ADMIN — OXYCODONE AND ACETAMINOPHEN PRN TAB: 5; 325 TABLET ORAL at 02:16

## 2020-08-31 RX ADMIN — Medication PRN ML: at 22:33

## 2020-08-31 RX ADMIN — METOPROLOL TARTRATE SCH MG: 50 TABLET, FILM COATED ORAL at 09:28

## 2020-08-31 RX ADMIN — Medication PRN ML: at 19:46

## 2020-08-31 RX ADMIN — SODIUM CHLORIDE SCH MLS/HR: 0.9 INJECTION, SOLUTION INTRAVENOUS at 19:32

## 2020-08-31 RX ADMIN — HYDROMORPHONE HYDROCHLORIDE PRN MG: 1 INJECTION, SOLUTION INTRAMUSCULAR; INTRAVENOUS; SUBCUTANEOUS at 18:58

## 2020-08-31 RX ADMIN — HYDROMORPHONE HYDROCHLORIDE PRN MG: 1 INJECTION, SOLUTION INTRAMUSCULAR; INTRAVENOUS; SUBCUTANEOUS at 14:15

## 2020-08-31 RX ADMIN — SODIUM CHLORIDE SCH MLS/HR: 0.9 INJECTION, SOLUTION INTRAVENOUS at 02:20

## 2020-08-31 RX ADMIN — HYDROMORPHONE HYDROCHLORIDE PRN MG: 1 INJECTION, SOLUTION INTRAMUSCULAR; INTRAVENOUS; SUBCUTANEOUS at 15:00

## 2020-08-31 RX ADMIN — SENNOSIDES SCH MG: 8.6 TABLET, FILM COATED ORAL at 22:29

## 2020-08-31 RX ADMIN — LISINOPRIL SCH MG: 40 TABLET ORAL at 09:28

## 2020-08-31 NOTE — PROGRESS NOTE
Assessment and Plan





- Patient Problems


(1) Abdominal pain


Current Visit: Yes   Status: Chronic   


Qualifiers: 


   Abdominal location: left lower quadrant   Qualified Code(s): R10.32 - Left 

lower quadrant pain   


Plan to address problem: 


Pt stable.  Feels better today after Abx started yesterday for UTI. E.Coli is 

sensitive to Levaquin. 





Pt has no questions about surgery today. 





The plan at this point is to do a cystoscopy today.  I will be present in the 

operating room should the need arise for an open bladder repair.  If there is 

bowel that is involved, then I will handle that portion.  This was explained to 

the patient.  





Did not have much success with the bowel prep as she was nauseated yesterday 

secondary to her UTI.





Please call with any questions.





Time=10min














Subjective


Date of service: 08/31/20


Patient Reports: Positive: no new complaints, feels better





Objective


                               Vital Signs - 12hr











  08/30/20 08/31/20 08/31/20





  23:41 00:00 04:52


 


Temperature 98.1 F  98.5 F


 


Pulse Rate 83 83 90


 


Respiratory 18  20





Rate   


 


Blood Pressure 144/88  142/83


 


O2 Sat by Pulse 99  98





Oximetry   














  08/31/20 08/31/20 08/31/20





  06:53 08:30 09:27


 


Temperature  98.4 F 


 


Pulse Rate  93 H 93 H


 


Respiratory 18 18 





Rate   


 


Blood Pressure  164/95 164/95


 


O2 Sat by Pulse  100 





Oximetry   














  08/31/20





  09:28


 


Temperature 


 


Pulse Rate 93 H


 


Respiratory 





Rate 


 


Blood Pressure 164/95


 


O2 Sat by Pulse 





Oximetry 














- General physical appearance


no distress, no pain, obese





- Respiratory


normal expansion, normal respiratory effort





- Abdomen


soft





- Integumentary


no rash, no growths, no abnormal pigmentation





- Psychiatric


oriented to time, oriented to person, oriented to place, speech is normal, 

memory intact





- Labs





                                 08/30/20 18:24





                                 08/30/20 05:29

## 2020-08-31 NOTE — POST OPERATIVE NOTE
Date of procedure: 08/31/20


Pre-op diagnosis: bladder perf 


Post-op diagnosis: same


Findings: 





1 cm hole 


Procedure: 





cysto expl lap 


closue hole 


Anesthesia: GETA


Surgeon: ULICES VICTORIA


Estimated blood loss: minimal


Pathology: list (bladder scar)


Specimen disposition: to lab


Condition: stable


Disposition: PACU

## 2020-08-31 NOTE — ANESTHESIA CONSULTATION
Anesthesia Consult and Med Hx


Date of service: 08/31/20





- Airway


Anesthetic Teeth Evaluation: Good


ROM Head & Neck: Adequate


Mental/Hyoid Distance: Adequate


Mallampati Class: Class II


Intubation Access Assessment: Probably Good





- Pre-Operative Health Status


ASA Pre-Surgery Classification: ASA3


Proposed Anesthetic Plan: General





- Pulmonary


Hx Smoking: Yes (quit 2015)


Hx Asthma: No


Hx Respiratory Symptoms: No


SOB: No


COPD: No


Hx Pneumonia: No


Hx Sleep Apnea: Yes





- Cardiovascular System


Hx Hypertension: Yes


Hx Coronary Artery Disease: No


Hx Heart Attack/AMI: No


Hx Angina: No


Hx Percutaneous Transluminal Coronary Angioplasty (PTCA): No


Hx Cardia Arrhythmia: No


Hx Pacemaker: No


Hx Internal Defibrillator: No


Hx Valvular Heart Disease: No


Hx Heart Murmur: No


Hx Peripheral Vascular Disease: No





- Central Nervous System


Hx Neuromuscular Disorder: No


Hx Seizures: No


CVA: No


Hx Back Pain: Yes


Hx Psychiatric Problems: No





- Gastrointestinal


Hx Ulcer: No


Hx Gastroesophageal Reflux Disease: Yes





- Endocrine


Hx Renal Disease: No


Hx End Stage Renal Disease: No


Hx Cirrhosis: No


Hx Liver Disease: No


Hx Insulin Dependent Diabetes: No


Hx Non-Insulin Dependent Diabetes: No


Hx Thyroid Disease: No


Hx Hypothyroidism: No


Hx Hyperthyroidism: No





- Hematic


Hx Anemia: No


Hx Sickle Cell Disease: No





- Other Systems


Hx Alcohol Use: Yes (occ.)


Hx Substance Use: No


Hx Cancer: No


Hx Obesity: Yes





- Additional Comments


Anesthesia Medical History Comments: Here [id number]

## 2020-08-31 NOTE — PROGRESS NOTE
Assessment and Plan





- Patient Problems


(1) Perforation of sigmoid colon due to diverticulitis


Current Visit: No   Status: Acute   


Plan to address problem: 


Surgery team consulted, IV antibiotic therapy, supportive care.  Patient is 

pending surgical exploration today








(2) Obesity (BMI 30.0-34.9)


Current Visit: No   Status: Acute   


Plan to address problem: 


Balanced diet, increase physical activity at discharge.








(3) FARHAN (acute kidney injury)


Current Visit: Yes   Status: Acute   


Plan to address problem: 


Resolved, IV fluid resuscitation therapy, supportive care.








(4) DVT prophylaxis


Current Visit: Yes   Status: Acute   


Plan to address problem: 


SCD to bilateral lower extremities while in bed, patient is ambulatory.








History


Interval history: 


38 YO Female HD #5 with Diverticulitis complicated by Perforated Viscus, FARHAN, 

with recurrent abdominal pain and hematuria suspected bladder injury and pending

surgical intervention today. .  Patient resting comfortably in bed.  No reported

nursing events.  Patient denies pain.








Hospitalist Physical





- Constitutional


Vitals: 


                                        











Temp Pulse Resp BP Pulse Ox


 


 98.5 F   85   20   143/91   93 


 


 08/31/20 19:26  08/31/20 19:26  08/31/20 19:45  08/31/20 19:26  08/31/20 19:26











General appearance: Present: no acute distress





- EENT


Eyes: Present: PERRL, EOM intact


ENT: hearing intact





- Neck


Neck: Present: supple





- Respiratory


Respiratory effort: normal


Respiratory: bilateral: CTA





- Cardiovascular


Rhythm: regular


Heart Sounds: Present: S1 & S2





- Extremities


Extremities: no ischemia


Peripheral Pulses: within normal limits





- Abdominal


General gastrointestinal: soft, non-distended





- Integumentary


Integumentary: Present: clear, dry





- Psychiatric


Psychiatric: appropriate mood/affect, cooperative





- Neurologic


Neurologic: CNII-XII intact





Results





- Labs


CBC & Chem 7: 


                                 08/31/20 17:59





                                 08/30/20 05:29


Labs: 


                             Laboratory Last Values











WBC  23.6 K/mm3 (4.5-11.0)  H  08/30/20  18:24    


 


RBC  3.08 M/mm3 (3.65-5.03)  L  08/30/20  18:24    


 


Hgb  8.2 gm/dl (10.1-14.3)  L  08/31/20  17:59    


 


Hct  24.1 % (30.3-42.9)  L  08/31/20  17:59    


 


MCV  83 fl (79-97)   08/30/20  18:24    


 


MCH  28 pg (28-32)   08/30/20  18:24    


 


MCHC  33 % (30-34)   08/30/20  18:24    


 


RDW  16.3 % (13.2-15.2)  H  08/30/20  18:24    


 


Plt Count  228 K/mm3 (140-440)   08/30/20  18:24    


 


Lymph % (Auto)  18.5 % (13.4-35.0)   08/27/20  06:07    


 


Mono % (Auto)  6.6 % (0.0-7.3)   08/30/20  18:24    


 


Eos % (Auto)  0.0 % (0.0-4.3)   08/30/20  18:24    


 


Baso % (Auto)  0.6 % (0.0-1.8)   08/27/20  06:07    


 


Lymph #  1.5 K/mm3 (1.2-5.4)   08/27/20  06:07    


 


Mono #  1.5 K/mm3 (0.0-0.8)  H  08/30/20  18:24    


 


Eos #  0.0 K/mm3 (0.0-0.4)   08/30/20  18:24    


 


Baso #  0.0 K/mm3 (0.0-0.1)   08/30/20  18:24    


 


Add Manual Diff  Complete   08/30/20  18:24    


 


Total Counted  200   08/30/20  18:24    


 


Seg Neutrophils %  89.4 % (40.0-70.0)  H  08/30/20  18:24    


 


Seg Neuts % (Manual)  93.5 % (40.0-70.0)  H  08/30/20  18:24    


 


Band Neutrophils %  0 %  08/30/20  18:24    


 


Lymphocytes % (Manual)  4.0 % (13.4-35.0)  L  08/30/20  18:24    


 


Reactive Lymphs % (Man)  0 %  08/30/20  18:24    


 


Monocytes % (Manual)  2.5 % (0.0-7.3)   08/30/20  18:24    


 


Eosinophils % (Manual)  0 % (0.0-4.3)   08/30/20  18:24    


 


Basophils % (Manual)  0 % (0.0-1.8)   08/30/20  18:24    


 


Metamyelocytes %  0 %  08/30/20  18:24    


 


Myelocytes %  0 %  08/30/20  18:24    


 


Promyelocytes %  0 %  08/30/20  18:24    


 


Blast Cells %  0 %  08/30/20  18:24    


 


Nucleated RBC %  Not Reportable   08/30/20  18:24    


 


Seg Neutrophils #  19.9 K/mm3 (1.8-7.7)  H  08/30/20  18:24    


 


Seg Neutrophils # Man  22.1 K/mm3 (1.8-7.7)  H  08/30/20  18:24    


 


Band Neutrophils #  0.0 K/mm3  08/30/20  18:24    


 


Lymphocytes # (Manual)  0.9 K/mm3 (1.2-5.4)  L  08/30/20  18:24    


 


Abs React Lymphs (Man)  0.0 K/mm3  08/30/20  18:24    


 


Monocytes # (Manual)  0.6 K/mm3 (0.0-0.8)   08/30/20  18:24    


 


Eosinophils # (Manual)  0.0 K/mm3 (0.0-0.4)   08/30/20  18:24    


 


Basophils # (Manual)  0.0 K/mm3 (0.0-0.1)   08/30/20  18:24    


 


Metamyelocytes #  0.0 K/mm3  08/30/20  18:24    


 


Myelocytes #  0.0 K/mm3  08/30/20  18:24    


 


Promyelocytes #  0.0 K/mm3  08/30/20  18:24    


 


Blast Cells #  0.0 K/mm3  08/30/20  18:24    


 


WBC Morphology  Not Reportable   08/30/20  18:24    


 


Hypersegmented Neuts  Not Reportable   08/30/20  18:24    


 


Hyposegmented Neuts  Not Reportable   08/30/20  18:24    


 


Hypogranular Neuts  Not Reportable   08/30/20  18:24    


 


Smudge Cells  Not Reportable   08/30/20  18:24    


 


Toxic Granulation  Not Reportable   08/30/20  18:24    


 


Toxic Vacuolation  Not Reportable   08/30/20  18:24    


 


Dohle Bodies  Not Reportable   08/30/20  18:24    


 


Pelger-Huet Anomaly  Not Reportable   08/30/20  18:24    


 


Joel Rods  Not Reportable   08/30/20  18:24    


 


Platelet Estimate  Consistent w auto   08/30/20  18:24    


 


Clumped Platelets  Not Reportable   08/30/20  18:24    


 


Plt Clumps, EDTA  Not Reportable   08/30/20  18:24    


 


Large Platelets  Not Reportable   08/30/20  18:24    


 


Giant Platelets  Not Reportable   08/30/20  18:24    


 


Platelet Satelliting  Not Reportable   08/30/20  18:24    


 


Plt Morphology Comment  Not Reportable   08/30/20  18:24    


 


RBC Morphology  Not Reportable   08/30/20  18:24    


 


Dimorphic RBCs  Not Reportable   08/30/20  18:24    


 


Polychromasia  Not Reportable   08/30/20  18:24    


 


Hypochromasia  Not Reportable   08/30/20  18:24    


 


Poikilocytosis  Not Reportable   08/30/20  18:24    


 


Anisocytosis  1+   08/30/20  18:24    


 


Microcytosis  Not Reportable   08/30/20  18:24    


 


Macrocytosis  Not Reportable   08/30/20  18:24    


 


Spherocytes  Not Reportable   08/30/20  18:24    


 


Pappenheimer Bodies  Not Reportable   08/30/20  18:24    


 


Sickle Cells  Not Reportable   08/30/20  18:24    


 


Target Cells  Not Reportable   08/30/20  18:24    


 


Tear Drop Cells  Not Reportable   08/30/20  18:24    


 


Ovalocytes  Not Reportable   08/30/20  18:24    


 


Helmet Cells  Not Reportable   08/30/20  18:24    


 


[place] Bodies  Not Reportable   08/30/20  18:24    


 


Cabot Rings  Not Reportable   08/30/20  18:24    


 


Gregory Cells  Not Reportable   08/30/20  18:24    


 


Bite Cells  Not Reportable   08/30/20  18:24    


 


Crenated Cell  Not Reportable   08/30/20  18:24    


 


Elliptocytes  Not Reportable   08/30/20  18:24    


 


Acanthocytes (Spur)  Not Reportable   08/30/20  18:24    


 


Rouleaux  Not Reportable   08/30/20  18:24    


 


Hemoglobin C Crystals  Not Reportable   08/30/20  18:24    


 


Schistocytes  Not Reportable   08/30/20  18:24    


 


Malaria parasites  Not Reportable   08/30/20  18:24    


 


Nilesh Bodies  Not Reportable   08/30/20  18:24    


 


Hem Pathologist Commnt  No   08/30/20  18:24    


 


Sodium  140 mmol/L (137-145)   08/30/20  05:29    


 


Potassium  3.6 mmol/L (3.6-5.0)   08/30/20  05:29    


 


Chloride  102.5 mmol/L ()   08/30/20  05:29    


 


Carbon Dioxide  21 mmol/L (22-30)  L  08/30/20  05:29    


 


Anion Gap  20 mmol/L  08/30/20  05:29    


 


BUN  8 mg/dL (7-17)   08/30/20  05:29    


 


Creatinine  1.2 mg/dL (0.6-1.2)   08/30/20  05:29    


 


Estimated GFR  > 60 ml/min  08/30/20  05:29    


 


BUN/Creatinine Ratio  7 %  08/30/20  05:29    


 


Glucose  90 mg/dL ()   08/30/20  05:29    


 


POC Glucose  87  ()   08/29/20  15:47    


 


Calcium  9.1 mg/dL (8.4-10.2)   08/30/20  05:29    


 


Blood Type  B POSITIVE   08/31/20  11:35    


 


Antibody Screen  Negative   08/31/20  11:35    


 


Crossmatch  See Detail   08/31/20  11:35    











Microbiology: 


Microbiology





08/30/20 15:31   Peripheral/Venous   Blood Culture - Preliminary


                            NO GROWTH AFTER 24 HOURS


08/30/20 15:31   Peripheral/Venous   Blood Culture - Preliminary


                            NO GROWTH AFTER 24 HOURS


08/28/20 Unknown   Urine,Clean Catch   Urine Culture - Final


                                Escherichia Coli








Gonsalves/IV: 


                                        





Voiding Method                   Indwelling Catheter


IV Catheter Type [Left           Peripheral IV


Antecubital]                     


IV Catheter Type [Right Wrist]   Peripheral IV











Active Medications





- Current Medications


Current Medications: 














Generic Name Dose Route Start Last Admin





  Trade Name Freq  PRN Reason Stop Dose Admin


 


Acetaminophen  650 mg  08/26/20 16:35  08/30/20 17:23





  Tylenol  PO   650 mg





  Q4H PRN   Administration





  Pain MILD(1-3)/Fever >100.5/HA  


 


Amlodipine Besylate  10 mg  08/29/20 14:44  08/31/20 09:28





  Amlodipine  PO   10 mg





  QDAY ANKUSH   Administration


 


Clonidine HCl  0.2 mg  08/28/20 18:06  08/31/20 09:27





  Catapres  PO   0.2 mg





  Q12HR ANKUSH   Administration


 


Hydromorphone HCl  0.25 mg  08/31/20 10:39  08/31/20 19:45





  Dilaudid  IV  08/31/20 23:00  0.25 mg





  Q10MIN PRN   Administration





  Pain, Moderate (4-6)  


 


Hydromorphone HCl  0.5 mg  08/31/20 16:57  08/31/20 18:58





  Dilaudid  IV   0.5 mg





  Q3H PRN   Administration





  Pain , Severe (7-10)  


 


Sodium Chloride  1,000 mls @ 125 mls/hr  08/26/20 16:45  08/31/20 19:32





  Nacl 0.9% 1000 Ml  IV   125 mls/hr





  AS DIRECT ANKUSH   Administration


 


Levofloxacin/Dextrose  750 mg in 150 mls @ 100 mls/hr  08/30/20 10:00  08/31/20 

09:28





  Levaquin 750mg/150ml  IV   100 mls/hr





  Q24HR ANKUSH   Administration





  Protocol  


 


Sodium Chloride  500 mls @ 0 mls/hr  08/31/20 12:51 





  Nacl 0.9% 500 Ml  IV  08/31/20 23:52 





  ONCE ANKUSH  





  As Directed  


 


Labetalol HCl  10 mg  08/29/20 14:44  08/30/20 08:40





  Labetalol  IV   10 mg





  Q4H PRN   Administration





  Hypertension  


 


Lisinopril  40 mg  08/28/20 10:00  08/31/20 09:28





  Zestril  PO   40 mg





  QDAY ANKUSH   Administration


 


Metoprolol Tartrate  100 mg  08/29/20 14:45  08/31/20 09:28





  Metoprolol  PO   100 mg





  BID ANKUSH   Administration


 


Midazolam HCl  2 mg  08/31/20 11:00  08/31/20 14:30





  Versed  IV  08/31/20 23:59  1 mg





  PREOP NR   Administration


 


Ondansetron HCl  4 mg  08/26/20 16:35  08/30/20 14:59





  Zofran  IV   4 mg





  Q8H PRN   Administration





  Nausea And Vomiting  


 


Ondansetron HCl  4 mg  08/31/20 10:39 





  Zofran  IV  08/31/20 23:00 





  ONCE PRN  





  Nausea And Vomiting  


 


Oxycodone/Acetaminophen  2 tab  08/28/20 11:59  08/31/20 09:27





  Percocet 5/325  PO   2 tab





  Q6H PRN   Administration





  Pain, Moderate (4-6)  


 


Polyethylene Glycol/Electrolytes  4,000 ml  08/30/20 17:00  08/30/20 17:19





  Golytely  PO   4,000 ml





  ONCE ANKUSH   Administration


 


Senna  17.2 mg  08/31/20 22:00 





  Senokot  PO  





  QHS ANKUSH  


 


Sodium Chloride  10 ml  08/26/20 16:35  08/31/20 19:46





  Sodium Chloride Flush Syringe 10 Ml  IV   10 ml





  PRN PRN   Administration





  LINE FLUSH

## 2020-09-01 LAB
BAND NEUTROPHILS # (MANUAL): 0 K/MM3
BUN SERPL-MCNC: 13 MG/DL (ref 7–17)
BUN/CREAT SERPL: 13 %
CALCIUM SERPL-MCNC: 7.4 MG/DL (ref 8.4–10.2)
HCT VFR BLD CALC: 20.8 % (ref 30.3–42.9)
HEMOLYSIS INDEX: 2
HGB BLD-MCNC: 6.9 GM/DL (ref 10.1–14.3)
MCHC RBC AUTO-ENTMCNC: 33 % (ref 30–34)
MCV RBC AUTO: 84 FL (ref 79–97)
MYELOCYTES # (MANUAL): 0 K/MM3
PLATELET # BLD: 203 K/MM3 (ref 140–440)
PROMYELOCYTES # (MANUAL): 0 K/MM3
RBC # BLD AUTO: 2.47 M/MM3 (ref 3.65–5.03)
TOTAL CELLS COUNTED BLD: 100

## 2020-09-01 PROCEDURE — 30233N1 TRANSFUSION OF NONAUTOLOGOUS RED BLOOD CELLS INTO PERIPHERAL VEIN, PERCUTANEOUS APPROACH: ICD-10-PCS | Performed by: UROLOGY

## 2020-09-01 RX ADMIN — SODIUM CHLORIDE SCH MLS/HR: 0.9 INJECTION, SOLUTION INTRAVENOUS at 21:01

## 2020-09-01 RX ADMIN — HYDROMORPHONE HYDROCHLORIDE PRN MG: 1 INJECTION, SOLUTION INTRAMUSCULAR; INTRAVENOUS; SUBCUTANEOUS at 13:06

## 2020-09-01 RX ADMIN — OXYCODONE AND ACETAMINOPHEN PRN TAB: 5; 325 TABLET ORAL at 15:55

## 2020-09-01 RX ADMIN — Medication PRN ML: at 03:02

## 2020-09-01 RX ADMIN — Medication PRN ML: at 22:14

## 2020-09-01 RX ADMIN — HYDROMORPHONE HYDROCHLORIDE PRN MG: 1 INJECTION, SOLUTION INTRAMUSCULAR; INTRAVENOUS; SUBCUTANEOUS at 17:10

## 2020-09-01 RX ADMIN — OXYCODONE AND ACETAMINOPHEN PRN TAB: 5; 325 TABLET ORAL at 06:47

## 2020-09-01 RX ADMIN — METOPROLOL TARTRATE SCH MG: 50 TABLET, FILM COATED ORAL at 09:44

## 2020-09-01 RX ADMIN — HYDROMORPHONE HYDROCHLORIDE PRN MG: 1 INJECTION, SOLUTION INTRAMUSCULAR; INTRAVENOUS; SUBCUTANEOUS at 09:45

## 2020-09-01 RX ADMIN — LISINOPRIL SCH MG: 40 TABLET ORAL at 09:44

## 2020-09-01 RX ADMIN — OXYCODONE AND ACETAMINOPHEN PRN TAB: 5; 325 TABLET ORAL at 20:56

## 2020-09-01 RX ADMIN — HYDROMORPHONE HYDROCHLORIDE PRN MG: 1 INJECTION, SOLUTION INTRAMUSCULAR; INTRAVENOUS; SUBCUTANEOUS at 02:59

## 2020-09-01 RX ADMIN — SODIUM CHLORIDE SCH MLS/HR: 0.9 INJECTION, SOLUTION INTRAVENOUS at 02:42

## 2020-09-01 RX ADMIN — SENNOSIDES SCH MG: 8.6 TABLET, FILM COATED ORAL at 21:01

## 2020-09-01 RX ADMIN — HYDROMORPHONE HYDROCHLORIDE PRN MG: 1 INJECTION, SOLUTION INTRAMUSCULAR; INTRAVENOUS; SUBCUTANEOUS at 22:13

## 2020-09-01 RX ADMIN — METOPROLOL TARTRATE SCH MG: 50 TABLET, FILM COATED ORAL at 21:01

## 2020-09-01 NOTE — PROGRESS NOTE
Assessment and Plan





38 yo F s/p exlap, cysto, repair of bladder, POD 1





Plan:


1. management of right sided drain per urology


2. continue L sided drain to bulb suction and record output - will be discharged

with this drain.


3. OOB/ambulate


4. transfuse prn per 1' service


5. prn pain control


6. soft diet


7. pulm toilet


8. RN notified about rice catheter leaking with balloon being down. Advised RN 

to call Dr. Salazar for further instructions.


9. Abx





Pt stable from general surgery standpoint. May start dc planning when ok with 

Urology.


Thank you, please call with questions





Subjective


Date of service: 09/01/20


Narrative: 





Pt seen and examined. c/o pain near incisions. No f/c. Tolerating diet. Getting 

PRBC transfusion. 





Objective


                               Vital Signs - 12hr











  09/01/20 09/01/20 09/01/20





  02:59 03:46 06:47


 


Temperature  98.3 F 


 


Pulse Rate  83 


 


Respiratory 18 16 20





Rate   


 


Blood Pressure  157/95 


 


Blood Pressure   





[Left]   


 


O2 Sat by Pulse  99 





Oximetry   














  09/01/20 09/01/20 09/01/20





  08:11 09:44 11:50


 


Temperature   80.8 F L


 


Pulse Rate 82 82 82


 


Respiratory   18





Rate   


 


Blood Pressure 166/94 166/94 154/84


 


Blood Pressure   





[Left]   


 


O2 Sat by Pulse 100  98





Oximetry   














  09/01/20 09/01/20 09/01/20





  12:00 12:05 12:35


 


Temperature 98.7 F 98.6 F 98.4 F


 


Pulse Rate 82 75 83


 


Respiratory 18 18 18





Rate   


 


Blood Pressure  157/99 146/88


 


Blood Pressure 166/94  





[Left]   


 


O2 Sat by Pulse 100 100 98





Oximetry   














  09/01/20 09/01/20 09/01/20





  13:05 13:35 14:32


 


Temperature 98.2 F 98.8 F 98.4 F


 


Pulse Rate 79 78 80


 


Respiratory 19 19 19





Rate   


 


Blood Pressure 148/93 154/92 151/89


 


Blood Pressure   





[Left]   


 


O2 Sat by Pulse 100 100 100





Oximetry   














- General physical appearance


Narrative Exam: 





Gen; AAOx3. NAD


CV: s1, S2+


Resp: even and unlabored


Abd: soft, ND, moderate TTP of abdomen diffusely. Dressing is saturated with 

serous drainage. drain to the right of the incision off bulb suction. L sided 

drain with purulent drainage. Dressing removed and skin cleansed. Some leakage 

from around right sided drain. New dressings applied. 


: rice catheter present with leakage around catheter and balloon noted to 

visible and not inflated - rice not manipulated. Clear yellow urine in rice 

bag





- Labs





                                 09/01/20 04:42





                                 09/01/20 04:42


                                 Diabetes panel











  09/01/20 Range/Units





  04:42 


 


Sodium  138  (137-145)  mmol/L


 


Potassium  3.6  (3.6-5.0)  mmol/L


 


Chloride  107.2 H  ()  mmol/L


 


Carbon Dioxide  17 L  (22-30)  mmol/L


 


BUN  13  (7-17)  mg/dL


 


Creatinine  1.0  (0.6-1.2)  mg/dL


 


Glucose  80  ()  mg/dL


 


Calcium  7.4 L D  (8.4-10.2)  mg/dL








                                  Calcium panel











  09/01/20 Range/Units





  04:42 


 


Calcium  7.4 L D  (8.4-10.2)  mg/dL








                                 Pituitary panel











  09/01/20 Range/Units





  04:42 


 


Sodium  138  (137-145)  mmol/L


 


Potassium  3.6  (3.6-5.0)  mmol/L


 


Chloride  107.2 H  ()  mmol/L


 


Carbon Dioxide  17 L  (22-30)  mmol/L


 


BUN  13  (7-17)  mg/dL


 


Creatinine  1.0  (0.6-1.2)  mg/dL


 


Glucose  80  ()  mg/dL


 


Calcium  7.4 L D  (8.4-10.2)  mg/dL








                                  Adrenal panel











  09/01/20 Range/Units





  04:42 


 


Sodium  138  (137-145)  mmol/L


 


Potassium  3.6  (3.6-5.0)  mmol/L


 


Chloride  107.2 H  ()  mmol/L


 


Carbon Dioxide  17 L  (22-30)  mmol/L


 


BUN  13  (7-17)  mg/dL


 


Creatinine  1.0  (0.6-1.2)  mg/dL


 


Glucose  80  ()  mg/dL


 


Calcium  7.4 L D  (8.4-10.2)  mg/dL

## 2020-09-01 NOTE — PROGRESS NOTE
Assessment and Plan





- Patient Problems


(1) Perforation of sigmoid colon due to diverticulitis


Current Visit: No   Status: Acute   


Plan to address problem: 


Surgery team consulted, IV antibiotic therapy, supportive care, S/P surgical 

intervention. D/C planning








(2) Obesity (BMI 30.0-34.9)


Current Visit: No   Status: Acute   


Plan to address problem: 


Balanced diet, increase physical activity at discharge.








(3) FARHAN (acute kidney injury)


Current Visit: Yes   Status: Acute   


Plan to address problem: 


Resolved, IV fluid resuscitation therapy, supportive care.








(4) DVT prophylaxis


Current Visit: Yes   Status: Acute   


Plan to address problem: 


SCD to bilateral lower extremities while in bed, patient is ambulatory.








History


Interval history: 


40 YO Female HD #6 with Diverticulitis complicated by Perforated Viscus, FARHAN, 

with recurrent abdominal pain and hematuria suspected bladder injury. Pt is 

POD#1 S/P surgical intervention .  Patient resting comfortably in bed.  No 

reported nursing events.  Patient denies pain. D/C planning.








Hospitalist Physical





- Constitutional


Vitals: 


                                        











Temp Pulse Resp BP Pulse Ox


 


 98.6 F   82   18   148/89   96 


 


 09/01/20 19:53  09/01/20 21:01  09/01/20 20:56  09/01/20 21:01  09/01/20 19:53











General appearance: Present: no acute distress





- EENT


Eyes: Present: PERRL, EOM intact


ENT: hearing intact





- Neck


Neck: Present: supple





- Respiratory


Respiratory effort: normal


Respiratory: bilateral: CTA





- Cardiovascular


Rhythm: regular


Heart Sounds: Present: S1 & S2





- Extremities


Extremities: no ischemia





- Abdominal


General gastrointestinal: soft, non-distended, other (dressing in place)





- Integumentary


Integumentary: Present: clear, dry





- Psychiatric


Psychiatric: appropriate mood/affect, cooperative





- Neurologic


Neurologic: CNII-XII intact





Results





- Labs


CBC & Chem 7: 


                                 09/01/20 04:42





                                 09/01/20 04:42


Labs: 


                             Laboratory Last Values











WBC  16.5 K/mm3 (4.5-11.0)  H  09/01/20  04:42    


 


RBC  2.47 M/mm3 (3.65-5.03)  L  09/01/20  04:42    


 


Hgb  6.9 gm/dl (10.1-14.3)  L  09/01/20  04:42    


 


Hct  20.8 % (30.3-42.9)  L  09/01/20  04:42    


 


MCV  84 fl (79-97)   09/01/20  04:42    


 


MCH  28 pg (28-32)   09/01/20  04:42    


 


MCHC  33 % (30-34)   09/01/20  04:42    


 


RDW  16.6 % (13.2-15.2)  H  09/01/20  04:42    


 


Plt Count  203 K/mm3 (140-440)   09/01/20  04:42    


 


Lymph % (Auto)  18.5 % (13.4-35.0)   08/27/20  06:07    


 


Mono % (Auto)  6.6 % (0.0-7.3)   08/30/20  18:24    


 


Eos % (Auto)  0.0 % (0.0-4.3)   08/30/20  18:24    


 


Baso % (Auto)  0.6 % (0.0-1.8)   08/27/20  06:07    


 


Lymph #  1.5 K/mm3 (1.2-5.4)   08/27/20  06:07    


 


Mono #  1.5 K/mm3 (0.0-0.8)  H  08/30/20  18:24    


 


Eos #  0.0 K/mm3 (0.0-0.4)   08/30/20  18:24    


 


Baso #  0.0 K/mm3 (0.0-0.1)   08/30/20  18:24    


 


Add Manual Diff  Complete   09/01/20  04:42    


 


Total Counted  100   09/01/20  04:42    


 


Seg Neutrophils %  Np   09/01/20  04:42    


 


Seg Neuts % (Manual)  94.0 % (40.0-70.0)  H  09/01/20  04:42    


 


Band Neutrophils %  0 %  09/01/20  04:42    


 


Lymphocytes % (Manual)  4.0 % (13.4-35.0)  L  09/01/20  04:42    


 


Reactive Lymphs % (Man)  0 %  09/01/20  04:42    


 


Monocytes % (Manual)  2.0 % (0.0-7.3)   09/01/20  04:42    


 


Eosinophils % (Manual)  0 % (0.0-4.3)   09/01/20  04:42    


 


Basophils % (Manual)  0 % (0.0-1.8)   09/01/20  04:42    


 


Metamyelocytes %  0 %  09/01/20  04:42    


 


Myelocytes %  0 %  09/01/20  04:42    


 


Promyelocytes %  0 %  09/01/20  04:42    


 


Blast Cells %  0 %  09/01/20  04:42    


 


Nucleated RBC %  Not Reportable   09/01/20  04:42    


 


Seg Neutrophils #  19.9 K/mm3 (1.8-7.7)  H  08/30/20  18:24    


 


Seg Neutrophils # Man  15.5 K/mm3 (1.8-7.7)  H  09/01/20  04:42    


 


Band Neutrophils #  0.0 K/mm3  09/01/20  04:42    


 


Lymphocytes # (Manual)  0.7 K/mm3 (1.2-5.4)  L  09/01/20  04:42    


 


Abs React Lymphs (Man)  0.0 K/mm3  09/01/20  04:42    


 


Monocytes # (Manual)  0.3 K/mm3 (0.0-0.8)   09/01/20  04:42    


 


Eosinophils # (Manual)  0.0 K/mm3 (0.0-0.4)   09/01/20  04:42    


 


Basophils # (Manual)  0.0 K/mm3 (0.0-0.1)   09/01/20  04:42    


 


Metamyelocytes #  0.0 K/mm3  09/01/20  04:42    


 


Myelocytes #  0.0 K/mm3  09/01/20  04:42    


 


Promyelocytes #  0.0 K/mm3  09/01/20  04:42    


 


Blast Cells #  0.0 K/mm3  09/01/20  04:42    


 


WBC Morphology  Not Reportable   09/01/20  04:42    


 


Hypersegmented Neuts  Not Reportable   09/01/20  04:42    


 


Hyposegmented Neuts  Not Reportable   09/01/20  04:42    


 


Hypogranular Neuts  Not Reportable   09/01/20  04:42    


 


Smudge Cells  Not Reportable   09/01/20  04:42    


 


Toxic Granulation  Not Reportable   09/01/20  04:42    


 


Toxic Vacuolation  Not Reportable   09/01/20  04:42    


 


Dohle Bodies  Not Reportable   09/01/20  04:42    


 


Pelger-Huet Anomaly  Not Reportable   09/01/20  04:42    


 


Joel Rods  Not Reportable   09/01/20  04:42    


 


Platelet Estimate  Consistent w auto   09/01/20  04:42    


 


Clumped Platelets  Not Reportable   09/01/20  04:42    


 


Plt Clumps, EDTA  Not Reportable   09/01/20  04:42    


 


Large Platelets  Not Reportable   09/01/20  04:42    


 


Giant Platelets  Not Reportable   09/01/20  04:42    


 


Platelet Satelliting  Not Reportable   09/01/20  04:42    


 


Plt Morphology Comment  Not Reportable   09/01/20  04:42    


 


RBC Morphology  Not Reportable   09/01/20  04:42    


 


Dimorphic RBCs  Not Reportable   09/01/20  04:42    


 


Polychromasia  Not Reportable   09/01/20  04:42    


 


Hypochromasia  Not Reportable   09/01/20  04:42    


 


Poikilocytosis  Not Reportable   09/01/20  04:42    


 


Anisocytosis  Few   09/01/20  04:42    


 


Microcytosis  Not Reportable   09/01/20  04:42    


 


Macrocytosis  Not Reportable   09/01/20  04:42    


 


Spherocytes  Not Reportable   09/01/20  04:42    


 


Pappenheimer Bodies  Not Reportable   09/01/20  04:42    


 


Sickle Cells  Not Reportable   09/01/20  04:42    


 


Target Cells  Not Reportable   09/01/20  04:42    


 


Tear Drop Cells  Not Reportable   09/01/20  04:42    


 


Ovalocytes  Few   09/01/20  04:42    


 


Helmet Cells  Not Reportable   09/01/20  04:42    


 


[place] Bodies  Not Reportable   09/01/20  04:42    


 


Cabot Rings  Not Reportable   09/01/20  04:42    


 


T[place]on Cells  Not Reportable   09/01/20  04:42    


 


Bite Cells  Not Reportable   09/01/20  04:42    


 


Crenated Cell  Not Reportable   09/01/20  04:42    


 


Elliptocytes  Not Reportable   09/01/20  04:42    


 


Acanthocytes (Spur)  Not Reportable   09/01/20  04:42    


 


Rouleaux  Not Reportable   09/01/20  04:42    


 


Hemoglobin C Crystals  Not Reportable   09/01/20  04:42    


 


Schistocytes  Not Reportable   09/01/20  04:42    


 


Malaria parasites  Not Reportable   09/01/20  04:42    


 


Nilesh Bodies  Not Reportable   09/01/20  04:42    


 


Hem Pathologist Commnt  No   09/01/20  04:42    


 


Sodium  138 mmol/L (137-145)   09/01/20  04:42    


 


Potassium  3.6 mmol/L (3.6-5.0)   09/01/20  04:42    


 


Chloride  107.2 mmol/L ()  H  09/01/20  04:42    


 


Carbon Dioxide  17 mmol/L (22-30)  L  09/01/20  04:42    


 


Anion Gap  17 mmol/L  09/01/20  04:42    


 


BUN  13 mg/dL (7-17)   09/01/20  04:42    


 


Creatinine  1.0 mg/dL (0.6-1.2)   09/01/20  04:42    


 


Estimated GFR  > 60 ml/min  09/01/20  04:42    


 


BUN/Creatinine Ratio  13 %  09/01/20  04:42    


 


Glucose  80 mg/dL ()   09/01/20  04:42    


 


POC Glucose  87  ()   08/29/20  15:47    


 


Calcium  7.4 mg/dL (8.4-10.2)  L D 09/01/20  04:42    


 


Blood Type  B POSITIVE   08/31/20  11:35    


 


Antibody Screen  Negative   08/31/20  11:35    


 


Crossmatch  See Detail   08/31/20  11:35    











Microbiology: 


Microbiology





08/30/20 15:31   Peripheral/Venous   Blood Culture - Preliminary


                            NO GROWTH AFTER 48 HOURS


08/30/20 15:31   Peripheral/Venous   Blood Culture - Preliminary


                            NO GROWTH AFTER 48 HOURS


08/31/20 Unknown   Other (Please Specify:) - Abscess   Surgical Culture - 

Preliminary








Gonsalves/IV: 


                                        





Voiding Method                   Indwelling Catheter


IV Catheter Type [Left           Peripheral IV


Antecubital]                     


IV Catheter Type [Right Wrist]   Peripheral IV











Active Medications





- Current Medications


Current Medications: 














Generic Name Dose Route Start Last Admin





  Trade Name Freq  PRN Reason Stop Dose Admin


 


Acetaminophen  650 mg  08/26/20 16:35  08/30/20 17:23





  Tylenol  PO   650 mg





  Q4H PRN   Administration





  Pain MILD(1-3)/Fever >100.5/HA  


 


Amlodipine Besylate  10 mg  09/02/20 10:00 





  Amlodipine  PO  





  QDAY ANKUSH  


 


Clonidine HCl  0.2 mg  08/28/20 18:06  09/01/20 21:00





  Catapres  PO   0.2 mg





  Q12HR ANKUSH   Administration


 


Hydromorphone HCl  0.5 mg  08/31/20 16:57  09/01/20 17:10





  Dilaudid  IV   0.5 mg





  Q3H PRN   Administration





  Pain , Severe (7-10)  


 


Sodium Chloride  1,000 mls @ 125 mls/hr  08/26/20 16:45  09/01/20 21:01





  Nacl 0.9% 1000 Ml  IV   125 mls/hr





  AS DIRECT ANKUSH   Administration


 


Levofloxacin/Dextrose  750 mg in 150 mls @ 100 mls/hr  08/30/20 10:00  09/01/20 

16:54





  Levaquin 750mg/150ml  IV   Infused





  Q24HR ANKUSH   Infusion





  Protocol  


 


Labetalol HCl  10 mg  08/29/20 14:44  08/30/20 08:40





  Labetalol  IV   10 mg





  Q4H PRN   Administration





  Hypertension  


 


Lisinopril  40 mg  08/28/20 10:00  09/01/20 09:44





  Zestril  PO   40 mg





  QDAY ANKUSH   Administration


 


Metoprolol Tartrate  100 mg  08/29/20 14:45  09/01/20 21:01





  Metoprolol  PO   100 mg





  BID ANKUSH   Administration


 


Ondansetron HCl  4 mg  08/26/20 16:35  08/30/20 14:59





  Zofran  IV   4 mg





  Q8H PRN   Administration





  Nausea And Vomiting  


 


Oxycodone/Acetaminophen  2 tab  08/28/20 11:59  09/01/20 20:56





  Percocet 5/325  PO   2 tab





  Q6H PRN   Administration





  Pain, Moderate (4-6)  


 


Senna  17.2 mg  08/31/20 22:00  09/01/20 21:01





  Senokot  PO   17.2 mg





  QHS ANKUSH   Administration


 


Sodium Chloride  10 ml  08/26/20 16:35  09/01/20 03:02





  Sodium Chloride Flush Syringe 10 Ml  IV   10 ml





  PRN PRN   Administration





  LINE FLUSH

## 2020-09-01 NOTE — PROGRESS NOTE
Assessment and Plan





inc sp drain overnight 


removed suction 


urine clear 


try to remove suction x 24 hrs 





Subjective


Date of service: 09/01/20


Principal diagnosis: Abdominal abscess, bladder fistula





Objective





- Constitutional


Vitals: 


                               Vital Signs - 12hr











  08/31/20 08/31/20 09/01/20





  23:13 23:14 00:00


 


Temperature 97.6 F  


 


Pulse Rate  89 83


 


Respiratory 16  





Rate   


 


Blood Pressure 149/89  


 


O2 Sat by Pulse  100 





Oximetry   














  09/01/20 09/01/20 09/01/20





  02:59 03:46 06:47


 


Temperature  98.3 F 


 


Pulse Rate  83 


 


Respiratory 18 16 20





Rate   


 


Blood Pressure  157/95 


 


O2 Sat by Pulse  99 





Oximetry   














  09/01/20 09/01/20





  08:11 09:44


 


Temperature  


 


Pulse Rate 82 82


 


Respiratory  





Rate  


 


Blood Pressure 166/94 166/94


 


O2 Sat by Pulse 100 





Oximetry  











General appearance: Present: no acute distress





- Neck


Neck: supple


Extremities: no ischemia





- Gastrointestinal


General gastrointestinal: Present: tender





- Labs


CBC & Chem 7: 


                                 09/01/20 04:42





                                 09/01/20 04:42


Labs: 


                              Abnormal lab results











  08/31/20 08/31/20 09/01/20 Range/Units





  11:35 17:59 04:42 


 


WBC    16.5 H  (4.5-11.0)  K/mm3


 


RBC    2.47 L  (3.65-5.03)  M/mm3


 


Hgb   8.2 L  6.9 L  (10.1-14.3)  gm/dl


 


Hct   24.1 L  20.8 L  (30.3-42.9)  %


 


RDW    16.6 H  (13.2-15.2)  %


 


Seg Neuts % (Manual)    94.0 H  (40.0-70.0)  %


 


Lymphocytes % (Manual)    4.0 L  (13.4-35.0)  %


 


Seg Neutrophils # Man    15.5 H  (1.8-7.7)  K/mm3


 


Lymphocytes # (Manual)    0.7 L  (1.2-5.4)  K/mm3


 


Chloride     ()  mmol/L


 


Carbon Dioxide     (22-30)  mmol/L


 


Calcium     (8.4-10.2)  mg/dL


 


Crossmatch  See Detail    














  09/01/20 Range/Units





  04:42 


 


WBC   (4.5-11.0)  K/mm3


 


RBC   (3.65-5.03)  M/mm3


 


Hgb   (10.1-14.3)  gm/dl


 


Hct   (30.3-42.9)  %


 


RDW   (13.2-15.2)  %


 


Seg Neuts % (Manual)   (40.0-70.0)  %


 


Lymphocytes % (Manual)   (13.4-35.0)  %


 


Seg Neutrophils # Man   (1.8-7.7)  K/mm3


 


Lymphocytes # (Manual)   (1.2-5.4)  K/mm3


 


Chloride  107.2 H  ()  mmol/L


 


Carbon Dioxide  17 L  (22-30)  mmol/L


 


Calcium  7.4 L D  (8.4-10.2)  mg/dL


 


Crossmatch   














Medications & Allergies





- Medications


Allergies/Adverse Reactions: 


                                    Allergies





No Known Allergies Allergy (Unverified 07/26/18 20:58)


   








Home Medications: 


                                Home Medications











 Medication  Instructions  Recorded  Confirmed  Last Taken  Type


 


Metoprolol [Lopressor TAB] 50 mg PO BID 07/26/18 08/29/20 07/26/18 History


 


Terazosin [Hytrin] 1 mg PO DAILY 07/26/18 08/29/20 Unknown History


 


cloNIDine [Catapres] 0.2 mg PO DAILY 07/26/18 08/29/20 07/26/18 History


 


lisinopriL [Zestril TAB] 40 mg PO QDAY 07/26/18 08/29/20 07/26/18 History


 


Fluticasone [Flonase] 1 spray NS QDAY #1 bottle 07/27/18 08/29/20 Unknown Rx


 


Amoxicillin/Potassium Clav 1 each PO BID #20 tablet 08/18/20 08/29/20 Unknown Rx





[Augmentin 875-125 Tablet]     











Active Medications: 














Generic Name Dose Route Start Last Admin





  Trade Name Freq  PRN Reason Stop Dose Admin


 


Acetaminophen  650 mg  08/26/20 16:35  08/30/20 17:23





  Tylenol  PO   650 mg





  Q4H PRN   Administration





  Pain MILD(1-3)/Fever >100.5/HA  


 


Amlodipine Besylate  10 mg  08/29/20 14:44  09/01/20 09:44





  Amlodipine  PO   10 mg





  QDAY ANKUSH   Administration


 


Clonidine HCl  0.2 mg  08/28/20 18:06  09/01/20 09:44





  Catapres  PO   0.2 mg





  Q12HR ANKUSH   Administration


 


Hydromorphone HCl  0.5 mg  08/31/20 16:57  09/01/20 09:45





  Dilaudid  IV   0.5 mg





  Q3H PRN   Administration





  Pain , Severe (7-10)  


 


Sodium Chloride  1,000 mls @ 125 mls/hr  08/26/20 16:45  09/01/20 02:42





  Nacl 0.9% 1000 Ml  IV   125 mls/hr





  AS DIRECT ANKUSH   Administration


 


Levofloxacin/Dextrose  750 mg in 150 mls @ 100 mls/hr  08/30/20 10:00  09/01/20 

09:45





  Levaquin 750mg/150ml  IV   100 mls/hr





  Q24HR ANKUSH   Administration





  Protocol  


 


Labetalol HCl  10 mg  08/29/20 14:44  08/30/20 08:40





  Labetalol  IV   10 mg





  Q4H PRN   Administration





  Hypertension  


 


Lisinopril  40 mg  08/28/20 10:00  09/01/20 09:44





  Zestril  PO   40 mg





  QDAY ANKUSH   Administration


 


Metoprolol Tartrate  100 mg  08/29/20 14:45  09/01/20 09:44





  Metoprolol  PO   100 mg





  BID ANKUSH   Administration


 


Ondansetron HCl  4 mg  08/26/20 16:35  08/30/20 14:59





  Zofran  IV   4 mg





  Q8H PRN   Administration





  Nausea And Vomiting  


 


Oxycodone/Acetaminophen  2 tab  08/28/20 11:59  09/01/20 06:47





  Percocet 5/325  PO   2 tab





  Q6H PRN   Administration





  Pain, Moderate (4-6)  


 


Polyethylene Glycol/Electrolytes  4,000 ml  08/30/20 17:00  08/30/20 17:19





  Golytely  PO   4,000 ml





  ONCE ANKUSH   Administration


 


Senna  17.2 mg  08/31/20 22:00  08/31/20 22:29





  Senokot  PO   17.2 mg





  QHS ANKUSH   Administration


 


Sodium Chloride  10 ml  08/26/20 16:35  09/01/20 03:02





  Sodium Chloride Flush Syringe 10 Ml  IV   10 ml





  PRN PRN   Administration





  LINE FLUSH

## 2020-09-02 LAB
BASOPHILS # (AUTO): 0 K/MM3 (ref 0–0.1)
BASOPHILS NFR BLD AUTO: 0.3 % (ref 0–1.8)
EOSINOPHIL # BLD AUTO: 0.1 K/MM3 (ref 0–0.4)
EOSINOPHIL NFR BLD AUTO: 0.5 % (ref 0–4.3)
HCT VFR BLD CALC: 28.9 % (ref 30.3–42.9)
HGB BLD-MCNC: 9.6 GM/DL (ref 10.1–14.3)
LYMPHOCYTES # BLD AUTO: 0.8 K/MM3 (ref 1.2–5.4)
LYMPHOCYTES NFR BLD AUTO: 5.6 % (ref 13.4–35)
MCHC RBC AUTO-ENTMCNC: 33 % (ref 30–34)
MCV RBC AUTO: 84 FL (ref 79–97)
MONOCYTES # (AUTO): 0.5 K/MM3 (ref 0–0.8)
MONOCYTES % (AUTO): 3.5 % (ref 0–7.3)
PLATELET # BLD: 235 K/MM3 (ref 140–440)
RBC # BLD AUTO: 3.45 M/MM3 (ref 3.65–5.03)

## 2020-09-02 RX ADMIN — SODIUM CHLORIDE SCH MLS/HR: 0.9 INJECTION, SOLUTION INTRAVENOUS at 06:52

## 2020-09-02 RX ADMIN — HYDROMORPHONE HYDROCHLORIDE PRN MG: 1 INJECTION, SOLUTION INTRAMUSCULAR; INTRAVENOUS; SUBCUTANEOUS at 10:01

## 2020-09-02 RX ADMIN — LISINOPRIL SCH MG: 40 TABLET ORAL at 10:07

## 2020-09-02 RX ADMIN — HYDROMORPHONE HYDROCHLORIDE PRN MG: 1 INJECTION, SOLUTION INTRAMUSCULAR; INTRAVENOUS; SUBCUTANEOUS at 06:47

## 2020-09-02 RX ADMIN — HYDROMORPHONE HYDROCHLORIDE PRN MG: 1 INJECTION, SOLUTION INTRAMUSCULAR; INTRAVENOUS; SUBCUTANEOUS at 14:58

## 2020-09-02 RX ADMIN — OXYCODONE AND ACETAMINOPHEN PRN TAB: 5; 325 TABLET ORAL at 01:41

## 2020-09-02 RX ADMIN — OXYCODONE AND ACETAMINOPHEN PRN TAB: 5; 325 TABLET ORAL at 12:05

## 2020-09-02 RX ADMIN — SODIUM CHLORIDE SCH MLS/HR: 0.9 INJECTION, SOLUTION INTRAVENOUS at 19:41

## 2020-09-02 RX ADMIN — METOPROLOL TARTRATE SCH MG: 50 TABLET, FILM COATED ORAL at 10:04

## 2020-09-02 RX ADMIN — METOPROLOL TARTRATE SCH MG: 50 TABLET, FILM COATED ORAL at 21:16

## 2020-09-02 RX ADMIN — OXYCODONE AND ACETAMINOPHEN PRN TAB: 5; 325 TABLET ORAL at 19:41

## 2020-09-02 RX ADMIN — SENNOSIDES SCH MG: 8.6 TABLET, FILM COATED ORAL at 21:15

## 2020-09-02 NOTE — PROGRESS NOTE
Assessment and Plan





- Patient Problems


(1) Perforation of sigmoid colon due to diverticulitis


Current Visit: No   Status: Acute   


Plan to address problem: 


Surgery team consulted, IV antibiotic therapy, supportive care, S/P surgical 

intervention. D/C planning








(2) Obesity (BMI 30.0-34.9)


Current Visit: No   Status: Acute   


Plan to address problem: 


Balanced diet, increase physical activity at discharge.








(3) FARHAN (acute kidney injury)


Current Visit: Yes   Status: Acute   


Plan to address problem: 


Resolved, IV fluid resuscitation therapy, supportive care.








(4) DVT prophylaxis


Current Visit: Yes   Status: Acute   


Plan to address problem: 


SCD to bilateral lower extremities while in bed, patient is ambulatory.








History


Interval history: 


38 YO Female HD #7 with Diverticulitis complicated by Perforated Viscus, FARHAN, 

with recurrent abdominal pain and hematuria suspected bladder injury. Pt is 

POD#1 S/P surgical intervention .  Patient resting comfortably in bed.  No 

reported nursing events.  Patient denies pain. D/C planning in am.








Hospitalist Physical





- Constitutional


Vitals: 


                                        











Temp Pulse Resp BP Pulse Ox


 


 98.8 F   87   18   165/103   95 


 


 09/02/20 16:29  09/02/20 16:29  09/02/20 16:29  09/02/20 16:29  09/02/20 16:29











General appearance: Present: no acute distress, obese





- EENT


Eyes: Present: PERRL, EOM intact


ENT: hearing intact





- Neck


Neck: Present: supple





- Respiratory


Respiratory effort: normal


Respiratory: bilateral: CTA





- Cardiovascular


Rhythm: regular


Heart Sounds: Present: S1 & S2





- Extremities


Extremities: no ischemia


Peripheral Pulses: within normal limits





- Abdominal


General gastrointestinal: soft, other (dressing in place)





- Integumentary


Integumentary: Present: clear, warm, dry





- Psychiatric


Psychiatric: appropriate mood/affect, cooperative





- Neurologic


Neurologic: CNII-XII intact





Results





- Labs


CBC & Chem 7: 


                                 09/02/20 01:39





                                 09/01/20 04:42


Labs: 


                             Laboratory Last Values











WBC  14.1 K/mm3 (4.5-11.0)  H  09/02/20  01:39    


 


RBC  3.45 M/mm3 (3.65-5.03)  L  09/02/20  01:39    


 


Hgb  9.6 gm/dl (10.1-14.3)  L  09/02/20  01:39    


 


Hct  28.9 % (30.3-42.9)  L D 09/02/20  01:39    


 


MCV  84 fl (79-97)   09/02/20  01:39    


 


MCH  28 pg (28-32)   09/02/20  01:39    


 


MCHC  33 % (30-34)   09/02/20  01:39    


 


RDW  16.2 % (13.2-15.2)  H  09/02/20  01:39    


 


Plt Count  235 K/mm3 (140-440)   09/02/20  01:39    


 


Lymph % (Auto)  5.6 % (13.4-35.0)  L  09/02/20  01:39    


 


Mono % (Auto)  3.5 % (0.0-7.3)   09/02/20  01:39    


 


Eos % (Auto)  0.5 % (0.0-4.3)   09/02/20  01:39    


 


Baso % (Auto)  0.3 % (0.0-1.8)   09/02/20  01:39    


 


Lymph #  0.8 K/mm3 (1.2-5.4)  L  09/02/20  01:39    


 


Mono #  0.5 K/mm3 (0.0-0.8)   09/02/20  01:39    


 


Eos #  0.1 K/mm3 (0.0-0.4)   09/02/20  01:39    


 


Baso #  0.0 K/mm3 (0.0-0.1)   09/02/20  01:39    


 


Add Manual Diff  Complete   09/01/20  04:42    


 


Total Counted  100   09/01/20  04:42    


 


Seg Neutrophils %  Np   09/02/20  01:39    


 


Seg Neuts % (Manual)  94.0 % (40.0-70.0)  H  09/01/20  04:42    


 


Band Neutrophils %  0 %  09/01/20  04:42    


 


Lymphocytes % (Manual)  4.0 % (13.4-35.0)  L  09/01/20  04:42    


 


Reactive Lymphs % (Man)  0 %  09/01/20  04:42    


 


Monocytes % (Manual)  2.0 % (0.0-7.3)   09/01/20  04:42    


 


Eosinophils % (Manual)  0 % (0.0-4.3)   09/01/20  04:42    


 


Basophils % (Manual)  0 % (0.0-1.8)   09/01/20  04:42    


 


Metamyelocytes %  0 %  09/01/20  04:42    


 


Myelocytes %  0 %  09/01/20  04:42    


 


Promyelocytes %  0 %  09/01/20  04:42    


 


Blast Cells %  0 %  09/01/20  04:42    


 


Nucleated RBC %  Not Reportable   09/01/20  04:42    


 


Seg Neutrophils #  12.7 K/mm3 (1.8-7.7)  H  09/02/20  01:39    


 


Seg Neutrophils # Man  15.5 K/mm3 (1.8-7.7)  H  09/01/20  04:42    


 


Band Neutrophils #  0.0 K/mm3  09/01/20  04:42    


 


Lymphocytes # (Manual)  0.7 K/mm3 (1.2-5.4)  L  09/01/20  04:42    


 


Abs React Lymphs (Man)  0.0 K/mm3  09/01/20  04:42    


 


Monocytes # (Manual)  0.3 K/mm3 (0.0-0.8)   09/01/20  04:42    


 


Eosinophils # (Manual)  0.0 K/mm3 (0.0-0.4)   09/01/20  04:42    


 


Basophils # (Manual)  0.0 K/mm3 (0.0-0.1)   09/01/20  04:42    


 


Metamyelocytes #  0.0 K/mm3  09/01/20  04:42    


 


Myelocytes #  0.0 K/mm3  09/01/20  04:42    


 


Promyelocytes #  0.0 K/mm3  09/01/20  04:42    


 


Blast Cells #  0.0 K/mm3  09/01/20  04:42    


 


WBC Morphology  Not Reportable   09/01/20  04:42    


 


Hypersegmented Neuts  Not Reportable   09/01/20  04:42    


 


Hyposegmented Neuts  Not Reportable   09/01/20  04:42    


 


Hypogranular Neuts  Not Reportable   09/01/20  04:42    


 


Smudge Cells  Not Reportable   09/01/20  04:42    


 


Toxic Granulation  Not Reportable   09/01/20  04:42    


 


Toxic Vacuolation  Not Reportable   09/01/20  04:42    


 


Dohle Bodies  Not Reportable   09/01/20  04:42    


 


Pelger-Huet Anomaly  Not Reportable   09/01/20  04:42    


 


Joel Rods  Not Reportable   09/01/20  04:42    


 


Platelet Estimate  Consistent w auto   09/01/20  04:42    


 


Clumped Platelets  Not Reportable   09/01/20  04:42    


 


Plt Clumps, EDTA  Not Reportable   09/01/20  04:42    


 


Large Platelets  Not Reportable   09/01/20  04:42    


 


Giant Platelets  Not Reportable   09/01/20  04:42    


 


Platelet Satelliting  Not Reportable   09/01/20  04:42    


 


Plt Morphology Comment  Not Reportable   09/01/20  04:42    


 


RBC Morphology  Not Reportable   09/01/20  04:42    


 


Dimorphic RBCs  Not Reportable   09/01/20  04:42    


 


Polychromasia  Not Reportable   09/01/20  04:42    


 


Hypochromasia  Not Reportable   09/01/20  04:42    


 


Poikilocytosis  Not Reportable   09/01/20  04:42    


 


Anisocytosis  Few   09/01/20  04:42    


 


Microcytosis  Not Reportable   09/01/20  04:42    


 


Macrocytosis  Not Reportable   09/01/20  04:42    


 


Spherocytes  Not Reportable   09/01/20  04:42    


 


Pappenheimer Bodies  Not Reportable   09/01/20  04:42    


 


Sickle Cells  Not Reportable   09/01/20  04:42    


 


Target Cells  Not Reportable   09/01/20  04:42    


 


Tear Drop Cells  Not Reportable   09/01/20  04:42    


 


Ovalocytes  Few   09/01/20  04:42    


 


Helmet Cells  Not Reportable   09/01/20  04:42    


 


Villanueva-[place] Bodies  Not Reportable   09/01/20  04:42    


 


Cabot Rings  Not Reportable   09/01/20  04:42    


 


Gregory Cells  Not Reportable   09/01/20  04:42    


 


Bite Cells  Not Reportable   09/01/20  04:42    


 


Crenated Cell  Not Reportable   09/01/20  04:42    


 


Elliptocytes  Not Reportable   09/01/20  04:42    


 


Acanthocytes (Spur)  Not Reportable   09/01/20  04:42    


 


Rouleaux  Not Reportable   09/01/20  04:42    


 


Hemoglobin C Crystals  Not Reportable   09/01/20  04:42    


 


Schistocytes  Not Reportable   09/01/20  04:42    


 


Malaria parasites  Not Reportable   09/01/20  04:42    


 


Nilesh Bodies  Not Reportable   09/01/20  04:42    


 


Hem Pathologist Commnt  No   09/01/20  04:42    


 


Sodium  138 mmol/L (137-145)   09/01/20  04:42    


 


Potassium  3.6 mmol/L (3.6-5.0)   09/01/20  04:42    


 


Chloride  107.2 mmol/L ()  H  09/01/20  04:42    


 


Carbon Dioxide  17 mmol/L (22-30)  L  09/01/20  04:42    


 


Anion Gap  17 mmol/L  09/01/20  04:42    


 


BUN  13 mg/dL (7-17)   09/01/20  04:42    


 


Creatinine  1.0 mg/dL (0.6-1.2)   09/01/20  04:42    


 


Estimated GFR  > 60 ml/min  09/01/20  04:42    


 


BUN/Creatinine Ratio  13 %  09/01/20  04:42    


 


Glucose  80 mg/dL ()   09/01/20  04:42    


 


POC Glucose  87  ()   08/29/20  15:47    


 


Calcium  7.4 mg/dL (8.4-10.2)  L D 09/01/20  04:42    


 


Blood Type  B POSITIVE   08/31/20  11:35    


 


Antibody Screen  Negative   08/31/20  11:35    


 


Crossmatch  See Detail   08/31/20  11:35    











Microbiology: 


Microbiology





08/31/20 Unknown   Other (Please Specify:) - Abscess   Anaerobic Culture - 

Preliminary


08/31/20 Unknown   Other (Please Specify:) - Abscess   Surgical Culture - 

Preliminary


                                Escherichia Coli


08/30/20 15:31   Peripheral/Venous   Blood Culture - Preliminary


                            NO GROWTH AFTER 72 HOURS


08/30/20 15:31   Peripheral/Venous   Blood Culture - Preliminary


                            NO GROWTH AFTER 72 HOURS








Gonsalves/IV: 


                                        





Voiding Method                   Indwelling Catheter


IV Catheter Type [Left           Peripheral IV


Antecubital]                     


IV Catheter Type [Right Wrist]   Peripheral IV











Active Medications





- Current Medications


Current Medications: 














Generic Name Dose Route Start Last Admin





  Trade Name Freq  PRN Reason Stop Dose Admin


 


Acetaminophen  650 mg  08/26/20 16:35  08/30/20 17:23





  Tylenol  PO   650 mg





  Q4H PRN   Administration





  Pain MILD(1-3)/Fever >100.5/HA  


 


Amlodipine Besylate  10 mg  09/02/20 10:00  09/02/20 10:06





  Amlodipine  PO   10 mg





  QDAY ANKUSH   Administration


 


Clonidine HCl  0.2 mg  08/28/20 18:06  09/02/20 10:05





  Catapres  PO   0.2 mg





  Q12HR ANKUSH   Administration


 


Hydromorphone HCl  0.5 mg  08/31/20 16:57  09/02/20 14:58





  Dilaudid  IV   0.5 mg





  Q3H PRN   Administration





  Pain , Severe (7-10)  


 


Sodium Chloride  1,000 mls @ 125 mls/hr  08/26/20 16:45  09/02/20 06:52





  Nacl 0.9% 1000 Ml  IV   125 mls/hr





  AS DIRECT ANKUSH   Administration


 


Levofloxacin/Dextrose  750 mg in 150 mls @ 100 mls/hr  08/30/20 10:00  09/02/20 

10:07





  Levaquin 750mg/150ml  IV   100 mls/hr





  Q24HR ANKUSH   Administration





  Protocol  


 


Labetalol HCl  10 mg  08/29/20 14:44  08/30/20 08:40





  Labetalol  IV   10 mg





  Q4H PRN   Administration





  Hypertension  


 


Lisinopril  40 mg  08/28/20 10:00  09/02/20 10:07





  Zestril  PO   40 mg





  QDAY ANKUSH   Administration


 


Metoprolol Tartrate  100 mg  08/29/20 14:45  09/02/20 10:04





  Metoprolol  PO   100 mg





  BID ANKUSH   Administration


 


Ondansetron HCl  4 mg  08/26/20 16:35  08/30/20 14:59





  Zofran  IV   4 mg





  Q8H PRN   Administration





  Nausea And Vomiting  


 


Oxycodone/Acetaminophen  2 tab  08/28/20 11:59  09/02/20 12:05





  Percocet 5/325  PO   2 tab





  Q6H PRN   Administration





  Pain, Moderate (4-6)  


 


Senna  17.2 mg  08/31/20 22:00  09/01/20 21:01





  Senokot  PO   17.2 mg





  QHS ANKUSH   Administration


 


Sodium Chloride  10 ml  08/26/20 16:35  09/01/20 22:14





  Sodium Chloride Flush Syringe 10 Ml  IV   10 ml





  PRN PRN   Administration





  LINE FLUSH

## 2020-09-02 NOTE — PROGRESS NOTE
Assessment and Plan





cath "fell out"


repaced now drainng better 


suspect defective cath 


drain put back on suction 


overnight 


observe 





Subjective


Date of service: 09/02/20


Principal diagnosis: Abdominal abscess, bladder fistula





Objective





- Constitutional


Vitals: 


                               Vital Signs - 12hr











  09/02/20 09/02/20 09/02/20





  01:41 04:48 06:47


 


Temperature  98.4 F 


 


Pulse Rate  85 


 


Respiratory 18 20 18





Rate   


 


Blood Pressure  153/96 


 


O2 Sat by Pulse  94 





Oximetry   














  09/02/20 09/02/20 09/02/20





  07:17 08:16 10:04


 


Temperature  98.2 F 


 


Pulse Rate  82 97 H


 


Respiratory 20 18 





Rate   


 


Blood Pressure  171/98 


 


O2 Sat by Pulse  99 





Oximetry   














  09/02/20 09/02/20 09/02/20





  10:05 10:06 10:07


 


Temperature   


 


Pulse Rate 97 H 97 H 97 H


 


Respiratory   





Rate   


 


Blood Pressure   


 


O2 Sat by Pulse   





Oximetry   














  09/02/20 09/02/20





  11:49 12:05


 


Temperature 98.4 F 


 


Pulse Rate 89 


 


Respiratory 20 16





Rate  


 


Blood Pressure 170/104 


 


O2 Sat by Pulse 96 





Oximetry  











General appearance: Present: no acute distress





- Neck


Neck: supple


Extremities: no ischemia





- Gastrointestinal


General gastrointestinal: Present: non-tender





- Labs


CBC & Chem 7: 


                                 09/02/20 01:39





                                 09/01/20 04:42


Labs: 


                              Abnormal lab results











  08/31/20 09/02/20 Range/Units





  11:35 01:39 


 


WBC   14.1 H  (4.5-11.0)  K/mm3


 


RBC   3.45 L  (3.65-5.03)  M/mm3


 


Hgb   9.6 L  (10.1-14.3)  gm/dl


 


Hct   28.9 L D  (30.3-42.9)  %


 


RDW   16.2 H  (13.2-15.2)  %


 


Lymph % (Auto)   5.6 L  (13.4-35.0)  %


 


Lymph #   0.8 L  (1.2-5.4)  K/mm3


 


Seg Neutrophils #   12.7 H  (1.8-7.7)  K/mm3


 


Crossmatch  See Detail   














Medications & Allergies





- Medications


Allergies/Adverse Reactions: 


                                    Allergies





No Known Allergies Allergy (Unverified 07/26/18 20:58)


   








Home Medications: 


                                Home Medications











 Medication  Instructions  Recorded  Confirmed  Last Taken  Type


 


Metoprolol [Lopressor TAB] 50 mg PO BID 07/26/18 08/29/20 07/26/18 History


 


Terazosin [Hytrin] 1 mg PO DAILY 07/26/18 08/29/20 Unknown History


 


cloNIDine [Catapres] 0.2 mg PO DAILY 07/26/18 08/29/20 07/26/18 History


 


lisinopriL [Zestril TAB] 40 mg PO QDAY 07/26/18 08/29/20 07/26/18 History


 


Fluticasone [Flonase] 1 spray NS QDAY #1 bottle 07/27/18 08/29/20 Unknown Rx


 


Amoxicillin/Potassium Clav 1 each PO BID #20 tablet 08/18/20 08/29/20 Unknown Rx





[Augmentin 765125 Tablet]     











Active Medications: 














Generic Name Dose Route Start Last Admin





  Trade Name Freq  PRN Reason Stop Dose Admin


 


Acetaminophen  650 mg  08/26/20 16:35  08/30/20 17:23





  Tylenol  PO   650 mg





  Q4H PRN   Administration





  Pain MILD(1-3)/Fever >100.5/HA  


 


Amlodipine Besylate  10 mg  09/02/20 10:00  09/02/20 10:06





  Amlodipine  PO   10 mg





  QDAY ANKUSH   Administration


 


Clonidine HCl  0.2 mg  08/28/20 18:06  09/02/20 10:05





  Catapres  PO   0.2 mg





  Q12HR ANKUSH   Administration


 


Hydromorphone HCl  0.5 mg  08/31/20 16:57  09/02/20 10:01





  Dilaudid  IV   0.5 mg





  Q3H PRN   Administration





  Pain , Severe (7-10)  


 


Sodium Chloride  1,000 mls @ 125 mls/hr  08/26/20 16:45  09/02/20 06:52





  Nacl 0.9% 1000 Ml  IV   125 mls/hr





  AS DIRECT ANKUSH   Administration


 


Levofloxacin/Dextrose  750 mg in 150 mls @ 100 mls/hr  08/30/20 10:00  09/02/20 

10:07





  Levaquin 750mg/150ml  IV   100 mls/hr





  Q24HR ANKUSH   Administration





  Protocol  


 


Labetalol HCl  10 mg  08/29/20 14:44  08/30/20 08:40





  Labetalol  IV   10 mg





  Q4H PRN   Administration





  Hypertension  


 


Lisinopril  40 mg  08/28/20 10:00  09/02/20 10:07





  Zestril  PO   40 mg





  QDAY ANKUSH   Administration


 


Metoprolol Tartrate  100 mg  08/29/20 14:45  09/02/20 10:04





  Metoprolol  PO   100 mg





  BID ANKUSH   Administration


 


Ondansetron HCl  4 mg  08/26/20 16:35  08/30/20 14:59





  Zofran  IV   4 mg





  Q8H PRN   Administration





  Nausea And Vomiting  


 


Oxycodone/Acetaminophen  2 tab  08/28/20 11:59  09/02/20 12:05





  Percocet 5/325  PO   2 tab





  Q6H PRN   Administration





  Pain, Moderate (4-6)  


 


Senna  17.2 mg  08/31/20 22:00  09/01/20 21:01





  Senokot  PO   17.2 mg





  QHS ANKUSH   Administration


 


Sodium Chloride  10 ml  08/26/20 16:35  09/01/20 22:14





  Sodium Chloride Flush Syringe 10 Ml  IV   10 ml





  PRN PRN   Administration





  LINE FLUSH

## 2020-09-02 NOTE — PROGRESS NOTE
Assessment and Plan





38 yo F s/p exlap, cysto, repair of bladder, POD 2





Plan:


1. management of right sided/suprapubic drain per urology


2. continue L sided drain to bulb suction and record output - will be discharged

with this drain.


3. OOB/ambulate - PT consulted


4. transfuse prn per 1' service


5. prn pain control


6. soft diet


7. pulm toilet


8. rice per urology


9. Abx





Pt stable from general surgery standpoint. May start dc planning when ok with 

Urology.


I had an extensive discussion with the patient's mother Giovanna Hedrick [phone]

[phone] at patient's request and updated her. All questions were answered. 


Thank you, please call with questions








Objective


                               Vital Signs - 12hr











  09/02/20 09/02/20 09/02/20





  06:47 07:17 08:16


 


Temperature   98.2 F


 


Pulse Rate   82


 


Respiratory 18 20 18





Rate   


 


Blood Pressure   171/98


 


O2 Sat by Pulse   99





Oximetry   














  09/02/20 09/02/20 09/02/20





  10:04 10:05 10:06


 


Temperature   


 


Pulse Rate 97 H 97 H 97 H


 


Respiratory   





Rate   


 


Blood Pressure   


 


O2 Sat by Pulse   





Oximetry   














  09/02/20 09/02/20 09/02/20





  10:07 11:49 12:00


 


Temperature  98.4 F 


 


Pulse Rate 97 H 89 80


 


Respiratory  20 





Rate   


 


Blood Pressure  170/104 


 


O2 Sat by Pulse  96 





Oximetry   














  09/02/20 09/02/20





  12:05 16:29


 


Temperature  98.8 F


 


Pulse Rate  87


 


Respiratory 16 18





Rate  


 


Blood Pressure  165/103


 


O2 Sat by Pulse  95





Oximetry  














- Labs





                                 09/02/20 01:39





                                 09/01/20 04:42

## 2020-09-03 RX ADMIN — METOPROLOL TARTRATE SCH MG: 50 TABLET, FILM COATED ORAL at 21:23

## 2020-09-03 RX ADMIN — HYDROMORPHONE HYDROCHLORIDE PRN MG: 1 INJECTION, SOLUTION INTRAMUSCULAR; INTRAVENOUS; SUBCUTANEOUS at 23:49

## 2020-09-03 RX ADMIN — SODIUM CHLORIDE SCH MLS/HR: 0.9 INJECTION, SOLUTION INTRAVENOUS at 04:16

## 2020-09-03 RX ADMIN — SENNOSIDES SCH MG: 8.6 TABLET, FILM COATED ORAL at 21:23

## 2020-09-03 RX ADMIN — LISINOPRIL SCH MG: 40 TABLET ORAL at 09:01

## 2020-09-03 RX ADMIN — METOPROLOL TARTRATE SCH MG: 50 TABLET, FILM COATED ORAL at 09:01

## 2020-09-03 RX ADMIN — HYDROMORPHONE HYDROCHLORIDE PRN MG: 1 INJECTION, SOLUTION INTRAMUSCULAR; INTRAVENOUS; SUBCUTANEOUS at 00:59

## 2020-09-03 RX ADMIN — OXYCODONE AND ACETAMINOPHEN PRN TAB: 5; 325 TABLET ORAL at 19:56

## 2020-09-03 RX ADMIN — OXYCODONE AND ACETAMINOPHEN PRN TAB: 5; 325 TABLET ORAL at 11:56

## 2020-09-03 RX ADMIN — OXYCODONE AND ACETAMINOPHEN PRN TAB: 5; 325 TABLET ORAL at 04:15

## 2020-09-03 RX ADMIN — HYDROMORPHONE HYDROCHLORIDE PRN MG: 1 INJECTION, SOLUTION INTRAMUSCULAR; INTRAVENOUS; SUBCUTANEOUS at 08:58

## 2020-09-03 NOTE — PROGRESS NOTE
Assessment and Plan





decreasing drainage 


urine clear 


home with rice and drain 








Subjective


Date of service: 09/03/20


Principal diagnosis: Abdominal abscess, bladder fistula





Objective





- Constitutional


Vitals: 


                               Vital Signs - 12hr











  09/03/20 09/03/20 09/03/20





  04:09 07:13 09:00


 


Temperature 98.6 F 98.3 F 


 


Pulse Rate 88 97 H 99 H


 


Respiratory 18 20 





Rate   


 


Blood Pressure 182/113 183/104 


 


O2 Sat by Pulse 100 100 





Oximetry   














  09/03/20 09/03/20 09/03/20





  09:01 10:47 10:49


 


Temperature   


 


Pulse Rate 99 H  


 


Respiratory   





Rate   


 


Blood Pressure  201/121 208/112


 


O2 Sat by Pulse   





Oximetry   














  09/03/20 09/03/20





  11:07 11:28


 


Temperature  


 


Pulse Rate 80 


 


Respiratory  





Rate  


 


Blood Pressure  201/107


 


O2 Sat by Pulse  





Oximetry  











General appearance: Present: no acute distress





- Neck


Neck: supple


Extremities: no ischemia





- Gastrointestinal


General gastrointestinal: Present: soft, tender





- Labs


CBC & Chem 7: 


                                 09/02/20 01:39





                                 09/01/20 04:42


Labs: 


                              Abnormal lab results











  08/31/20 Range/Units





  11:35 


 


Crossmatch  See Detail  














Medications & Allergies





- Medications


Allergies/Adverse Reactions: 


                                    Allergies





No Known Allergies Allergy (Unverified 07/26/18 20:58)


   








Home Medications: 


                                Home Medications











 Medication  Instructions  Recorded  Confirmed  Last Taken  Type


 


Metoprolol [Lopressor TAB] 50 mg PO BID 07/26/18 08/29/20 07/26/18 History


 


Terazosin [Hytrin] 1 mg PO DAILY 07/26/18 08/29/20 Unknown History


 


cloNIDine [Catapres] 0.2 mg PO DAILY 07/26/18 08/29/20 07/26/18 History


 


lisinopriL [Zestril TAB] 40 mg PO QDAY 07/26/18 08/29/20 07/26/18 History


 


Fluticasone [Flonase] 1 spray NS QDAY #1 bottle 07/27/18 08/29/20 Unknown Rx


 


Amoxicillin/Potassium Clav 1 each PO BID #20 tablet 08/18/20 08/29/20 Unknown Rx





[Augmentin 875-125 Tablet]     











Active Medications: 














Generic Name Dose Route Start Last Admin





  Trade Name Freq  PRN Reason Stop Dose Admin


 


Acetaminophen  650 mg  08/26/20 16:35  08/30/20 17:23





  Tylenol  PO   650 mg





  Q4H PRN   Administration





  Pain MILD(1-3)/Fever >100.5/HA  


 


Amlodipine Besylate  10 mg  09/02/20 10:00  09/03/20 09:00





  Amlodipine  PO   10 mg





  QDAY ANKUSH   Administration


 


Clonidine HCl  0.2 mg  08/28/20 18:06  09/03/20 09:01





  Catapres  PO   0.2 mg





  Q12HR ANKUSH   Administration


 


Hydromorphone HCl  0.5 mg  08/31/20 16:57  09/03/20 08:58





  Dilaudid  IV   0.5 mg





  Q3H PRN   Administration





  Pain , Severe (7-10)  


 


Sodium Chloride  1,000 mls @ 125 mls/hr  08/26/20 16:45  09/03/20 04:16





  Nacl 0.9% 1000 Ml  IV   125 mls/hr





  AS DIRECT ANKUSH   Administration


 


Levofloxacin/Dextrose  750 mg in 150 mls @ 100 mls/hr  08/30/20 10:00  09/03/20 

11:09





  Levaquin 750mg/150ml  IV   100 mls/hr





  Q24HR ANKUSH   Administration





  Protocol  


 


Labetalol HCl  10 mg  08/29/20 14:44  09/03/20 11:07





  Labetalol  IV   10 mg





  Q4H PRN   Administration





  Hypertension  


 


Lisinopril  40 mg  08/28/20 10:00  09/03/20 09:01





  Zestril  PO   40 mg





  QDAY ANKUSH   Administration


 


Metoprolol Tartrate  100 mg  08/29/20 14:45  09/03/20 09:01





  Metoprolol  PO   100 mg





  BID ANKUSH   Administration


 


Ondansetron HCl  4 mg  08/26/20 16:35  08/30/20 14:59





  Zofran  IV   4 mg





  Q8H PRN   Administration





  Nausea And Vomiting  


 


Oxycodone/Acetaminophen  2 tab  08/28/20 11:59  09/03/20 11:56





  Percocet 5/325  PO   2 tab





  Q6H PRN   Administration





  Pain, Moderate (4-6)  


 


Senna  17.2 mg  08/31/20 22:00  09/02/20 21:15





  Senokot  PO   17.2 mg





  [facility]   Administration


 


Sodium Chloride  10 ml  08/26/20 16:35  09/01/20 22:14





  Sodium Chloride Flush Syringe 10 Ml  IV   10 ml





  PRN PRN   Administration





  LINE FLUSH

## 2020-09-04 RX ADMIN — ACETAMINOPHEN PRN MG: 325 TABLET ORAL at 22:01

## 2020-09-04 RX ADMIN — OXYCODONE AND ACETAMINOPHEN PRN TAB: 5; 325 TABLET ORAL at 17:13

## 2020-09-04 RX ADMIN — METOPROLOL TARTRATE SCH MG: 50 TABLET, FILM COATED ORAL at 22:03

## 2020-09-04 RX ADMIN — SENNOSIDES SCH MG: 8.6 TABLET, FILM COATED ORAL at 22:02

## 2020-09-04 RX ADMIN — METOPROLOL TARTRATE SCH MG: 50 TABLET, FILM COATED ORAL at 09:36

## 2020-09-04 RX ADMIN — OXYCODONE AND ACETAMINOPHEN PRN TAB: 5; 325 TABLET ORAL at 03:51

## 2020-09-04 RX ADMIN — LISINOPRIL SCH MG: 40 TABLET ORAL at 09:36

## 2020-09-04 RX ADMIN — OXYCODONE AND ACETAMINOPHEN PRN TAB: 5; 325 TABLET ORAL at 10:37

## 2020-09-04 NOTE — PROGRESS NOTE
Assessment and Plan





- Patient Problems


(1) Abdominal pain


Current Visit: Yes   Status: Chronic   


Qualifiers: 


   Abdominal location: left lower quadrant   Qualified Code(s): R10.32 - Left 

lower quadrant pain   


Plan to address problem: 


Pt stable.  Patient appears much improved today.  Okay from general surgery 

standpoint for discharge.





Recommendations:


1.  Diet as tolerated


2.  Record daily drain outputs


3.  Continue with daily senna use


4.  Follow-up with general surgery in 2 weeks


5.  Encourage patient to work with physical therapy


6.  Would continue with oral Levaquin therapy upon discharge as she grew out the

same bacteria a in the suprapubic area as well as her urine.  Would plan to 

complete a two-week course.





Please call with any questions.





Time=10min














Subjective


Date of service: 09/04/20


Patient Reports: Positive: no new complaints, feels better, still having pain, 

tolerating liquids well.  Negative: nausea, vomiting





Objective


                               Vital Signs - 12hr











  09/03/20 09/03/20 09/04/20





  23:20 23:36 00:00


 


Temperature  99.1 F 


 


Pulse Rate 82 82 81


 


Respiratory  20 





Rate   


 


Blood Pressure   


 


Blood Pressure  187/112 





[Left]   


 


O2 Sat by Pulse 100 100 





Oximetry   














  09/04/20





  04:43


 


Temperature 99.0 F


 


Pulse Rate 86


 


Respiratory 18





Rate 


 


Blood Pressure 188/116


 


Blood Pressure 





[Left] 


 


O2 Sat by Pulse 99





Oximetry 














- General physical appearance


no distress, no pain, obese, other (looks better)





- Respiratory


normal expansion, normal respiratory effort





- Abdomen


soft, other (suprapubic tube with purulent drainage, but no urine; rice with 

clear yellow urine; Left flank drain with minimal serous drainage)





- Integumentary


no rash, no growths, no abnormal pigmentation





- Psychiatric


oriented to time, oriented to person, oriented to place, speech is normal, 

memory intact





- Labs





                                 09/02/20 01:39





                                 09/01/20 04:42

## 2020-09-04 NOTE — PROGRESS NOTE
Assessment and Plan





- Patient Problems


(1) Perforation of sigmoid colon due to diverticulitis


Current Visit: No   Status: Acute   


Plan to address problem: 


Surgery team consulted, IV antibiotic therapy, supportive care, S/P surgical 

intervention. D/C planning








(2) Obesity (BMI 30.0-34.9)


Current Visit: No   Status: Acute   


Plan to address problem: 


Balanced diet, increase physical activity at discharge.








(3) FARHAN (acute kidney injury)


Current Visit: Yes   Status: Acute   


Plan to address problem: 


Resolved, IV fluid resuscitation therapy, supportive care.








(4) DVT prophylaxis


Current Visit: Yes   Status: Acute   


Plan to address problem: 


SCD to bilateral lower extremities while in bed, patient is ambulatory.








History


Interval history: 


40 YO Female HD #8 with Diverticulitis complicated by Perforated Viscus, FARHAN, 

with recurrent abdominal pain and hematuria suspected bladder injury. Pt is 

POD#1 S/P surgical intervention .  Patient resting comfortably in bed.  No 

reported nursing events.  Patient denies pain. D/C planning in am.








Hospitalist Physical





- Constitutional


Vitals: 


                                        











Temp Pulse Resp BP Pulse Ox


 


 99.0 F   86   18   188/116   99 


 


 09/04/20 04:43  09/04/20 04:43  09/04/20 04:43  09/04/20 04:43  09/04/20 04:43











General appearance: Present: no acute distress





- EENT


Eyes: Present: PERRL, EOM intact


ENT: hearing intact





- Neck


Neck: Present: supple





- Respiratory


Respiratory: bilateral: CTA





- Cardiovascular


Rhythm: regular


Heart Sounds: Present: S1 & S2





- Extremities


Extremities: no ischemia


Peripheral Pulses: within normal limits





- Abdominal


General gastrointestinal: soft, other (dressing in place)





- Integumentary


Integumentary: Present: clear, dry





- Psychiatric


Psychiatric: appropriate mood/affect, cooperative





- Neurologic


Neurologic: CNII-XII intact





Results





- Labs


CBC & Chem 7: 


                                 09/02/20 01:39





                                 09/01/20 04:42


Labs: 


                             Laboratory Last Values











WBC  14.1 K/mm3 (4.5-11.0)  H  09/02/20  01:39    


 


RBC  3.45 M/mm3 (3.65-5.03)  L  09/02/20  01:39    


 


Hgb  9.6 gm/dl (10.1-14.3)  L  09/02/20  01:39    


 


Hct  28.9 % (30.3-42.9)  L D 09/02/20  01:39    


 


MCV  84 fl (79-97)   09/02/20  01:39    


 


MCH  28 pg (28-32)   09/02/20  01:39    


 


MCHC  33 % (30-34)   09/02/20  01:39    


 


RDW  16.2 % (13.2-15.2)  H  09/02/20  01:39    


 


Plt Count  235 K/mm3 (140-440)   09/02/20  01:39    


 


Lymph % (Auto)  5.6 % (13.4-35.0)  L  09/02/20  01:39    


 


Mono % (Auto)  3.5 % (0.0-7.3)   09/02/20  01:39    


 


Eos % (Auto)  0.5 % (0.0-4.3)   09/02/20  01:39    


 


Baso % (Auto)  0.3 % (0.0-1.8)   09/02/20  01:39    


 


Lymph #  0.8 K/mm3 (1.2-5.4)  L  09/02/20  01:39    


 


Mono #  0.5 K/mm3 (0.0-0.8)   09/02/20  01:39    


 


Eos #  0.1 K/mm3 (0.0-0.4)   09/02/20  01:39    


 


Baso #  0.0 K/mm3 (0.0-0.1)   09/02/20  01:39    


 


Add Manual Diff  Complete   09/01/20  04:42    


 


Total Counted  100   09/01/20  04:42    


 


Seg Neutrophils %  Np   09/02/20  01:39    


 


Seg Neuts % (Manual)  94.0 % (40.0-70.0)  H  09/01/20  04:42    


 


Band Neutrophils %  0 %  09/01/20  04:42    


 


Lymphocytes % (Manual)  4.0 % (13.4-35.0)  L  09/01/20  04:42    


 


Reactive Lymphs % (Man)  0 %  09/01/20  04:42    


 


Monocytes % (Manual)  2.0 % (0.0-7.3)   09/01/20  04:42    


 


Eosinophils % (Manual)  0 % (0.0-4.3)   09/01/20  04:42    


 


Basophils % (Manual)  0 % (0.0-1.8)   09/01/20  04:42    


 


Metamyelocytes %  0 %  09/01/20  04:42    


 


Myelocytes %  0 %  09/01/20  04:42    


 


Promyelocytes %  0 %  09/01/20  04:42    


 


Blast Cells %  0 %  09/01/20  04:42    


 


Nucleated RBC %  Not Reportable   09/01/20  04:42    


 


Seg Neutrophils #  12.7 K/mm3 (1.8-7.7)  H  09/02/20  01:39    


 


Seg Neutrophils # Man  15.5 K/mm3 (1.8-7.7)  H  09/01/20  04:42    


 


Band Neutrophils #  0.0 K/mm3  09/01/20  04:42    


 


Lymphocytes # (Manual)  0.7 K/mm3 (1.2-5.4)  L  09/01/20  04:42    


 


Abs React Lymphs (Man)  0.0 K/mm3  09/01/20  04:42    


 


Monocytes # (Manual)  0.3 K/mm3 (0.0-0.8)   09/01/20  04:42    


 


Eosinophils # (Manual)  0.0 K/mm3 (0.0-0.4)   09/01/20  04:42    


 


Basophils # (Manual)  0.0 K/mm3 (0.0-0.1)   09/01/20  04:42    


 


Metamyelocytes #  0.0 K/mm3  09/01/20  04:42    


 


Myelocytes #  0.0 K/mm3  09/01/20  04:42    


 


Promyelocytes #  0.0 K/mm3  09/01/20  04:42    


 


Blast Cells #  0.0 K/mm3  09/01/20  04:42    


 


WBC Morphology  Not Reportable   09/01/20  04:42    


 


Hypersegmented Neuts  Not Reportable   09/01/20  04:42    


 


Hyposegmented Neuts  Not Reportable   09/01/20  04:42    


 


Hypogranular Neuts  Not Reportable   09/01/20  04:42    


 


Smudge Cells  Not Reportable   09/01/20  04:42    


 


Toxic Granulation  Not Reportable   09/01/20  04:42    


 


Toxic Vacuolation  Not Reportable   09/01/20  04:42    


 


Dohle Bodies  Not Reportable   09/01/20  04:42    


 


Pelger-Huet Anomaly  Not Reportable   09/01/20  04:42    


 


Joel Rods  Not Reportable   09/01/20  04:42    


 


Platelet Estimate  Consistent w auto   09/01/20  04:42    


 


Clumped Platelets  Not Reportable   09/01/20  04:42    


 


Plt Clumps, EDTA  Not Reportable   09/01/20  04:42    


 


Large Platelets  Not Reportable   09/01/20  04:42    


 


Giant Platelets  Not Reportable   09/01/20  04:42    


 


Platelet Satelliting  Not Reportable   09/01/20  04:42    


 


Plt Morphology Comment  Not Reportable   09/01/20  04:42    


 


RBC Morphology  Not Reportable   09/01/20  04:42    


 


Dimorphic RBCs  Not Reportable   09/01/20  04:42    


 


Polychromasia  Not Reportable   09/01/20  04:42    


 


Hypochromasia  Not Reportable   09/01/20  04:42    


 


Poikilocytosis  Not Reportable   09/01/20  04:42    


 


Anisocytosis  Few   09/01/20  04:42    


 


Microcytosis  Not Reportable   09/01/20  04:42    


 


Macrocytosis  Not Reportable   09/01/20  04:42    


 


Spherocytes  Not Reportable   09/01/20  04:42    


 


Pappenheimer Bodies  Not Reportable   09/01/20  04:42    


 


Sickle Cells  Not Reportable   09/01/20  04:42    


 


Target Cells  Not Reportable   09/01/20  04:42    


 


Tear Drop Cells  Not Reportable   09/01/20  04:42    


 


Ovalocytes  Few   09/01/20  04:42    


 


Helmet Cells  Not Reportable   09/01/20  04:42    


 


Villanueva-[place] Bodies  Not Reportable   09/01/20  04:42    


 


Cabot Rings  Not Reportable   09/01/20  04:42    


 


Gregory Cells  Not Reportable   09/01/20  04:42    


 


Bite Cells  Not Reportable   09/01/20  04:42    


 


Crenated Cell  Not Reportable   09/01/20  04:42    


 


Elliptocytes  Not Reportable   09/01/20  04:42    


 


Acanthocytes (Spur)  Not Reportable   09/01/20  04:42    


 


Rouleaux  Not Reportable   09/01/20  04:42    


 


Hemoglobin C Crystals  Not Reportable   09/01/20  04:42    


 


Schistocytes  Not Reportable   09/01/20  04:42    


 


Malaria parasites  Not Reportable   09/01/20  04:42    


 


Nilesh Bodies  Not Reportable   09/01/20  04:42    


 


Hem Pathologist Commnt  No   09/01/20  04:42    


 


Sodium  138 mmol/L (137-145)   09/01/20  04:42    


 


Potassium  3.6 mmol/L (3.6-5.0)   09/01/20  04:42    


 


Chloride  107.2 mmol/L ()  H  09/01/20  04:42    


 


Carbon Dioxide  17 mmol/L (22-30)  L  09/01/20  04:42    


 


Anion Gap  17 mmol/L  09/01/20  04:42    


 


BUN  13 mg/dL (7-17)   09/01/20  04:42    


 


Creatinine  1.0 mg/dL (0.6-1.2)   09/01/20  04:42    


 


Estimated GFR  > 60 ml/min  09/01/20  04:42    


 


BUN/Creatinine Ratio  13 %  09/01/20  04:42    


 


Glucose  80 mg/dL ()   09/01/20  04:42    


 


POC Glucose  87  ()   08/29/20  15:47    


 


Calcium  7.4 mg/dL (8.4-10.2)  L D 09/01/20  04:42    


 


Blood Type  B POSITIVE   08/31/20  11:35    


 


Antibody Screen  Negative   08/31/20  11:35    


 


Crossmatch  See Detail   08/31/20  11:35    











Microbiology: 


Microbiology





08/31/20 Unknown   Other (Please Specify:) - Abscess   Surgical Culture - 

Preliminary


                                Escherichia Coli


08/30/20 15:31   Peripheral/Venous   Blood Culture - Preliminary


                            NO GROWTH AFTER 4 DAYS


08/30/20 15:31   Peripheral/Venous   Blood Culture - Preliminary


                            NO GROWTH AFTER 4 DAYS








Gonsalves/IV: 


                                        





Voiding Method                   Indwelling Catheter


IV Catheter Type [Left           Peripheral IV


Antecubital]                     


IV Catheter Type [Right Wrist]   Peripheral IV











Active Medications





- Current Medications


Current Medications: 














Generic Name Dose Route Start Last Admin





  Trade Name Freq  PRN Reason Stop Dose Admin


 


Acetaminophen  650 mg  08/26/20 16:35  08/30/20 17:23





  Tylenol  PO   650 mg





  Q4H PRN   Administration





  Pain MILD(1-3)/Fever >100.5/HA  


 


Amlodipine Besylate  10 mg  09/02/20 10:00  09/04/20 09:36





  Amlodipine  PO   10 mg





  QDAY ANKUSH   Administration


 


Clonidine HCl  0.2 mg  08/28/20 18:06  09/04/20 09:36





  Catapres  PO   0.2 mg





  Q12HR ANKUSH   Administration


 


Hydromorphone HCl  0.5 mg  08/31/20 16:57  09/03/20 23:49





  Dilaudid  IV   0.5 mg





  Q3H PRN   Administration





  Pain , Severe (7-10)  


 


Sodium Chloride  1,000 mls @ 125 mls/hr  08/26/20 16:45  09/03/20 04:16





  Nacl 0.9% 1000 Ml  IV   125 mls/hr





  AS DIRECT ANKUSH   Administration


 


Levofloxacin/Dextrose  750 mg in 150 mls @ 100 mls/hr  08/30/20 10:00  09/04/20 

09:36





  Levaquin 750mg/150ml  IV   100 mls/hr





  Q24HR ANKUSH   Administration





  Protocol  


 


Labetalol HCl  10 mg  08/29/20 14:44  09/04/20 04:57





  Labetalol  IV   10 mg





  Q4H PRN   Administration





  Hypertension  


 


Lisinopril  40 mg  08/28/20 10:00  09/04/20 09:36





  Zestril  PO   40 mg





  QDAY ANKUSH   Administration


 


Magnesium Hydroxide  30 ml  09/03/20 15:22 





  Milk Of Magnesia  PO  





  QDAY PRN  





  Constipation  


 


Metoprolol Tartrate  100 mg  08/29/20 14:45  09/04/20 09:36





  Metoprolol  PO   100 mg





  BID ANKUSH   Administration


 


Ondansetron HCl  4 mg  08/26/20 16:35  08/30/20 14:59





  Zofran  IV   4 mg





  Q8H PRN   Administration





  Nausea And Vomiting  


 


Oxycodone/Acetaminophen  2 tab  08/28/20 11:59  09/04/20 10:37





  Percocet 5/325  PO   2 tab





  Q6H PRN   Administration





  Pain, Moderate (4-6)  


 


Senna  17.2 mg  08/31/20 22:00  09/03/20 21:23





  Senokot  PO   17.2 mg





  QHS ANKUSH   Administration


 


Sodium Chloride  10 ml  08/26/20 16:35  09/01/20 22:14





  Sodium Chloride Flush Syringe 10 Ml  IV   10 ml





  PRN PRN   Administration





  LINE FLUSH  














Nutrition/Malnutrition Assess





- Dietary Evaluation


Nutrition/Malnutrition Findings: 


                                        





Nutrition Notes                                            Start:  09/03/20 

12:13


Freq:                                                      Status: Active       




Protocol:                                                                       




 Document     09/03/20 12:14  AT  (Rec: 09/03/20 13:05  AT  [id number])


 Co-Sign      09/03/20 12:14  LM


 Nutrition Notes


     Need for Assessment generated from:         LOS


     Initial or Follow up                        Assessment


     Current Diagnosis                           Acute Kidney Injury,


                                                 Hypertension


     Other Pertinent Diagnosis                   Diverticulitis, Perforation of


                                                 sigmoid colon


     Current Diet                                [facility] soft


     Labs/Tests                                  Ca 7.4


                                                 CO2 17


     Pertinent Medications                       Amlodipine


                                                 Dilaudid


     Height                                      5 ft 2 in


     Weight                                      86.7 kg


     Usual Body Weight                           89 kg


     Ideal Body Weight (kg)                      50.00


     BMI                                         34.9


     Intake Prior to Admission                   Excellent


     Weight change and time frame                1% weight change since


                                                 admission (08/26/20).


     Weight Status                               Obese


     Subjective/Other Information                Screen for LOS. Pt reports


                                                 having no appetite due to


                                                 receiving the wrong tray or


                                                 unappealing foods. Pt reported


                                                 a previously healthy appetite


                                                 prior to admission. Pt is


                                                 open to improving intake if


                                                 the diet is changed to GI soft


                                                 . Pt reports previous vomiting


                                                 , but symptoms have improved


                                                 without nausea.


     Burn                                        Absent


     Trauma                                      Absent


     GI Symptoms                                 None


     Current % PO                                Poor (25-49%)


     Minimum of two criteria                     No


     Interpretation of Weight Loss (non-         1-2% in 1 week


      severe)                                    


     #1


      Nutrition Diagnosis                        Inadequate energy intake


      Etiology                                   [facility] soft diet (inappropriate


                                                 diet choice)


      As Evidenced by Signs and Symptoms         poor appetite and 1% weight


                                                 loss in 9 days.


     Is patient on ventilator?                   No


     Is Patient Ambulatory and/or Out of Bed     Yes


     REE-([place]-ambulatory/OOB) [     [phone]


      [email or web address]]                               


     Kcal/Kg value to use for calculation        17


     Approximate Energy Requirements Using       1474


      kcal/Kg                                    


     Calculation Used for Recommendations        Kcal/kg


     Additional Notes                            PRO needs: 44 - 66g (0.8 - 1.2


                                                 g/kg AdBW 55 kg)


                                                 Fluid needs: 1 mL/kcal


 Nutrition Intervention


     Change Diet Order:                          GI Soft


     Goal #1                                     Meet 75% of energy and protein


                                                 needs.


     Goal #2                                     Diet tolerance


     Anticipated Discharge Needs:                GI soft diet


     Follow-Up By:                               09/08/20


     Additional Comments                         F/U on PO intake, diet


                                                 tolerance, and need for ONS.

## 2020-09-05 VITALS — SYSTOLIC BLOOD PRESSURE: 188 MMHG | DIASTOLIC BLOOD PRESSURE: 112 MMHG

## 2020-09-05 RX ADMIN — METOPROLOL TARTRATE SCH MG: 50 TABLET, FILM COATED ORAL at 10:11

## 2020-09-05 RX ADMIN — OXYCODONE AND ACETAMINOPHEN PRN TAB: 5; 325 TABLET ORAL at 07:51

## 2020-09-05 RX ADMIN — LISINOPRIL SCH MG: 40 TABLET ORAL at 10:11

## 2020-09-05 RX ADMIN — OXYCODONE AND ACETAMINOPHEN PRN TAB: 5; 325 TABLET ORAL at 00:09

## 2020-09-05 RX ADMIN — OXYCODONE AND ACETAMINOPHEN PRN TAB: 5; 325 TABLET ORAL at 14:13

## 2020-09-05 NOTE — PROGRESS NOTE
Assessment and Plan





- Patient Problems


(1) Perforation of sigmoid colon due to diverticulitis


Current Visit: No   Status: Acute   


Plan to address problem: 


Surgery team consulted, continue 14-day course of oral antibiotic therapy, 

supportive care. D/C planning








(2) Obesity (BMI 30.0-34.9)


Current Visit: No   Status: Acute   


Plan to address problem: 


Balanced diet, increase physical activity at discharge.








(3) FARHAN (acute kidney injury)


Current Visit: Yes   Status: Acute   


Plan to address problem: 


Resolved








(4) DVT prophylaxis


Current Visit: Yes   Status: Acute   


Plan to address problem: 


SCD to bilateral lower extremities while in bed, patient is ambulatory.








History


Interval history: 


38 YO Female HD #9 with Diverticulitis complicated by Perforated Viscus, FARHAN, 

with recurrent abdominal pain and hematuria suspected bladder injury.   Patient 

resting comfortably in bed.  Patient medically optimized.  Patient discharged.  

No reported nursing events.  





Hospitalist Physical





- Constitutional


Vitals: 


                                        











Temp Pulse Resp BP Pulse Ox


 


 98.2 F   80   18   188/112   99 


 


 09/05/20 08:49  09/05/20 11:37  09/05/20 08:49  09/05/20 12:21  09/05/20 11:37











General appearance: Present: no acute distress





- EENT


Eyes: Present: PERRL, EOM intact


ENT: hearing intact





- Neck


Neck: Present: supple





- Respiratory


Respiratory: bilateral: CTA





- Cardiovascular


Rhythm: regular


Heart Sounds: Present: S1 & S2





- Extremities


Extremities: no ischemia


Peripheral Pulses: within normal limits





- Abdominal


General gastrointestinal: soft, non-distended





- Integumentary


Integumentary: Present: clear, dry





- Psychiatric


Psychiatric: appropriate mood/affect, cooperative





- Neurologic


Neurologic: CNII-XII intact





Results





- Labs


CBC & Chem 7: 


                                 09/02/20 01:39





                                 09/01/20 04:42


Labs: 


                             Laboratory Last Values











WBC  14.1 K/mm3 (4.5-11.0)  H  09/02/20  01:39    


 


RBC  3.45 M/mm3 (3.65-5.03)  L  09/02/20  01:39    


 


Hgb  9.6 gm/dl (10.1-14.3)  L  09/02/20  01:39    


 


Hct  28.9 % (30.3-42.9)  L D 09/02/20  01:39    


 


MCV  84 fl (79-97)   09/02/20  01:39    


 


MCH  28 pg (28-32)   09/02/20  01:39    


 


MCHC  33 % (30-34)   09/02/20  01:39    


 


RDW  16.2 % (13.2-15.2)  H  09/02/20  01:39    


 


Plt Count  235 K/mm3 (140-440)   09/02/20  01:39    


 


Lymph % (Auto)  5.6 % (13.4-35.0)  L  09/02/20  01:39    


 


Mono % (Auto)  3.5 % (0.0-7.3)   09/02/20  01:39    


 


Eos % (Auto)  0.5 % (0.0-4.3)   09/02/20  01:39    


 


Baso % (Auto)  0.3 % (0.0-1.8)   09/02/20  01:39    


 


Lymph #  0.8 K/mm3 (1.2-5.4)  L  09/02/20  01:39    


 


Mono #  0.5 K/mm3 (0.0-0.8)   09/02/20  01:39    


 


Eos #  0.1 K/mm3 (0.0-0.4)   09/02/20  01:39    


 


Baso #  0.0 K/mm3 (0.0-0.1)   09/02/20  01:39    


 


Add Manual Diff  Complete   09/01/20  04:42    


 


Total Counted  100   09/01/20  04:42    


 


Seg Neutrophils %  Np   09/02/20  01:39    


 


Seg Neuts % (Manual)  94.0 % (40.0-70.0)  H  09/01/20  04:42    


 


Band Neutrophils %  0 %  09/01/20  04:42    


 


Lymphocytes % (Manual)  4.0 % (13.4-35.0)  L  09/01/20  04:42    


 


Reactive Lymphs % (Man)  0 %  09/01/20  04:42    


 


Monocytes % (Manual)  2.0 % (0.0-7.3)   09/01/20  04:42    


 


Eosinophils % (Manual)  0 % (0.0-4.3)   09/01/20  04:42    


 


Basophils % (Manual)  0 % (0.0-1.8)   09/01/20  04:42    


 


Metamyelocytes %  0 %  09/01/20  04:42    


 


Myelocytes %  0 %  09/01/20  04:42    


 


Promyelocytes %  0 %  09/01/20  04:42    


 


Blast Cells %  0 %  09/01/20  04:42    


 


Nucleated RBC %  Not Reportable   09/01/20  04:42    


 


Seg Neutrophils #  12.7 K/mm3 (1.8-7.7)  H  09/02/20  01:39    


 


Seg Neutrophils # Man  15.5 K/mm3 (1.8-7.7)  H  09/01/20  04:42    


 


Band Neutrophils #  0.0 K/mm3  09/01/20  04:42    


 


Lymphocytes # (Manual)  0.7 K/mm3 (1.2-5.4)  L  09/01/20  04:42    


 


Abs React Lymphs (Man)  0.0 K/mm3  09/01/20  04:42    


 


Monocytes # (Manual)  0.3 K/mm3 (0.0-0.8)   09/01/20  04:42    


 


Eosinophils # (Manual)  0.0 K/mm3 (0.0-0.4)   09/01/20  04:42    


 


Basophils # (Manual)  0.0 K/mm3 (0.0-0.1)   09/01/20  04:42    


 


Metamyelocytes #  0.0 K/mm3  09/01/20  04:42    


 


Myelocytes #  0.0 K/mm3  09/01/20  04:42    


 


Promyelocytes #  0.0 K/mm3  09/01/20  04:42    


 


Blast Cells #  0.0 K/mm3  09/01/20  04:42    


 


WBC Morphology  Not Reportable   09/01/20  04:42    


 


Hypersegmented Neuts  Not Reportable   09/01/20  04:42    


 


Hyposegmented Neuts  Not Reportable   09/01/20  04:42    


 


Hypogranular Neuts  Not Reportable   09/01/20  04:42    


 


Smudge Cells  Not Reportable   09/01/20  04:42    


 


Toxic Granulation  Not Reportable   09/01/20  04:42    


 


Toxic Vacuolation  Not Reportable   09/01/20  04:42    


 


Dohle Bodies  Not Reportable   09/01/20  04:42    


 


Pelger-Huet Anomaly  Not Reportable   09/01/20  04:42    


 


Joel Rods  Not Reportable   09/01/20  04:42    


 


Platelet Estimate  Consistent w auto   09/01/20  04:42    


 


Clumped Platelets  Not Reportable   09/01/20  04:42    


 


Plt Clumps, EDTA  Not Reportable   09/01/20  04:42    


 


Large Platelets  Not Reportable   09/01/20  04:42    


 


Giant Platelets  Not Reportable   09/01/20  04:42    


 


Platelet Satelliting  Not Reportable   09/01/20  04:42    


 


Plt Morphology Comment  Not Reportable   09/01/20  04:42    


 


RBC Morphology  Not Reportable   09/01/20  04:42    


 


Dimorphic RBCs  Not Reportable   09/01/20  04:42    


 


Polychromasia  Not Reportable   09/01/20  04:42    


 


Hypochromasia  Not Reportable   09/01/20  04:42    


 


Poikilocytosis  Not Reportable   09/01/20  04:42    


 


Anisocytosis  Few   09/01/20  04:42    


 


Microcytosis  Not Reportable   09/01/20  04:42    


 


Macrocytosis  Not Reportable   09/01/20  04:42    


 


Spherocytes  Not Reportable   09/01/20  04:42    


 


Pappenheimer Bodies  Not Reportable   09/01/20  04:42    


 


Sickle Cells  Not Reportable   09/01/20  04:42    


 


Target Cells  Not Reportable   09/01/20  04:42    


 


Tear Drop Cells  Not Reportable   09/01/20  04:42    


 


Ovalocytes  Few   09/01/20  04:42    


 


Helmet Cells  Not Reportable   09/01/20  04:42    


 


Villanueva-Wharton Bodies  Not Reportable   09/01/20  04:42    


 


Cabot Rings  Not Reportable   09/01/20  04:42    


 


[place] Cells  Not Reportable   09/01/20  04:42    


 


Bite Cells  Not Reportable   09/01/20  04:42    


 


Crenated Cell  Not Reportable   09/01/20  04:42    


 


Elliptocytes  Not Reportable   09/01/20  04:42    


 


Acanthocytes (Spur)  Not Reportable   09/01/20  04:42    


 


Rouleaux  Not Reportable   09/01/20  04:42    


 


Hemoglobin C Crystals  Not Reportable   09/01/20  04:42    


 


Schistocytes  Not Reportable   09/01/20  04:42    


 


Malaria parasites  Not Reportable   09/01/20  04:42    


 


Nilesh Bodies  Not Reportable   09/01/20  04:42    


 


Hem Pathologist Commnt  No   09/01/20  04:42    


 


Sodium  138 mmol/L (137-145)   09/01/20  04:42    


 


Potassium  3.6 mmol/L (3.6-5.0)   09/01/20  04:42    


 


Chloride  107.2 mmol/L ()  H  09/01/20  04:42    


 


Carbon Dioxide  17 mmol/L (22-30)  L  09/01/20  04:42    


 


Anion Gap  17 mmol/L  09/01/20  04:42    


 


BUN  13 mg/dL (7-17)   09/01/20  04:42    


 


Creatinine  1.0 mg/dL (0.6-1.2)   09/01/20  04:42    


 


Estimated GFR  > 60 ml/min  09/01/20  04:42    


 


BUN/Creatinine Ratio  13 %  09/01/20  04:42    


 


Glucose  80 mg/dL ()   09/01/20  04:42    


 


POC Glucose  87  ()   08/29/20  15:47    


 


Calcium  7.4 mg/dL (8.4-10.2)  L D 09/01/20  04:42    


 


Blood Type  B POSITIVE   08/31/20  11:35    


 


Antibody Screen  Negative   08/31/20  11:35    


 


Crossmatch  See Detail   08/31/20  11:35    











Microbiology: 


Microbiology





08/30/20 15:31   Peripheral/Venous   Blood Culture - Final


                            NO GROWTH AFTER 5 DAYS


08/30/20 15:31   Peripheral/Venous   Blood Culture - Final


                            NO GROWTH AFTER 5 DAYS


08/31/20 Unknown   Other (Please Specify:) - Abscess   Surgical Culture - 

Preliminary


                                Escherichia Coli








Gonsalves/IV: 


                                        





Voiding Method                   Indwelling Catheter


IV Catheter Type [Right          INT / Saline Lock


Forearm]                         


IV Catheter Type [Left           Peripheral IV


Antecubital]                     


IV Catheter Type [Right Wrist]   Peripheral IV











Active Medications





- Current Medications


Current Medications: 














Generic Name Dose Route Start Last Admin





  Trade Name Freq  PRN Reason Stop Dose Admin


 


Acetaminophen  650 mg  08/26/20 16:35  09/04/20 22:01





  Tylenol  PO   650 mg





  Q4H PRN   Administration





  Pain MILD(1-3)/Fever >100.5/HA  


 


Amlodipine Besylate  10 mg  09/02/20 10:00  09/05/20 10:11





  Amlodipine  PO   10 mg





  QDAY ANKUSH   Administration


 


Clonidine HCl  0.2 mg  08/28/20 18:06  09/05/20 10:10





  Catapres  PO   0.2 mg





  Q12HR ANKUSH   Administration


 


Hydromorphone HCl  0.5 mg  08/31/20 16:57  09/03/20 23:49





  Dilaudid  IV   0.5 mg





  Q3H PRN   Administration





  Pain , Severe (7-10)  


 


Sodium Chloride  1,000 mls @ 125 mls/hr  08/26/20 16:45  09/03/20 04:16





  Nacl 0.9% 1000 Ml  IV   125 mls/hr





  AS DIRECT ANKUSH   Administration


 


Levofloxacin/Dextrose  750 mg in 150 mls @ 100 mls/hr  08/30/20 10:00  09/05/20 

10:09





  Levaquin 750mg/150ml  IV   100 mls/hr





  Q24HR ANUKSH   Administration





  Protocol  


 


Labetalol HCl  10 mg  08/29/20 14:44  09/05/20 12:21





  Labetalol  IV   10 mg





  Q4H PRN   Administration





  Hypertension  


 


Lisinopril  40 mg  08/28/20 10:00  09/05/20 10:11





  Zestril  PO   40 mg





  QDAY ANKUSH   Administration


 


Magnesium Hydroxide  30 ml  09/03/20 15:22 





  Milk Of Magnesia  PO  





  QDAY PRN  





  Constipation  


 


Metoprolol Tartrate  100 mg  08/29/20 14:45  09/05/20 10:11





  Metoprolol  PO   100 mg





  BID ANKUSH   Administration


 


Ondansetron HCl  4 mg  08/26/20 16:35  08/30/20 14:59





  Zofran  IV   4 mg





  Q8H PRN   Administration





  Nausea And Vomiting  


 


Oxycodone/Acetaminophen  2 tab  08/28/20 11:59  09/05/20 07:51





  Percocet 5/325  PO   2 tab





  Q6H PRN   Administration





  Pain, Moderate (4-6)  


 


Senna  17.2 mg  09/05/20 22:00 





  Senokot  PO  





  BID ANKUSH  


 


Sodium Chloride  10 ml  08/26/20 16:35  09/01/20 22:14





  Sodium Chloride Flush Syringe 10 Ml  IV   10 ml





  PRN PRN   Administration





  LINE FLUSH  














Nutrition/Malnutrition Assess





- Dietary Evaluation


Nutrition/Malnutrition Findings: 


                                        





Nutrition Notes                                            Start:  09/03/20 

12:13


Freq:                                                      Status: Active       




Protocol:                                                                       




 Document     09/03/20 12:14  AT  (Rec: 09/03/20 13:05  AT  [id number])


 Co-Sign      09/03/20 12:14  LM


 Nutrition Notes


     Need for Assessment generated from:         LOS


     Initial or Follow up                        Assessment


     Current Diagnosis                           Acute Kidney Injury,


                                                 Hypertension


     Other Pertinent Diagnosis                   Diverticulitis, Perforation of


                                                 sigmoid colon


     Current Diet                                Mech soft


     Labs/Tests                                  Ca 7.4


                                                 CO2 17


     Pertinent Medications                       Amlodipine


                                                 Dilaudid


     Height                                      5 ft 2 in


     Weight                                      86.7 kg


     Usual Body Weight                           89 kg


     Ideal Body Weight (kg)                      50.00


     BMI                                         34.9


     Intake Prior to Admission                   Excellent


     Weight change and time frame                1% weight change since


                                                 admission (08/26/20).


     Weight Status                               Obese


     Subjective/Other Information                Screen for LOS. Pt reports


                                                 having no appetite due to


                                                 receiving the wrong tray or


                                                 unappealing foods. Pt reported


                                                 a previously healthy appetite


                                                 prior to admission. Pt is


                                                 open to improving intake if


                                                 the diet is changed to GI soft


                                                 . Pt reports previous vomiting


                                                 , but symptoms have improved


                                                 without nausea.


     Burn                                        Absent


     Trauma                                      Absent


     GI Symptoms                                 None


     Current % PO                                Poor (25-49%)


     Minimum of two criteria                     No


     Interpretation of Weight Loss (non-         1-2% in 1 week


      severe)                                    


     #1


      Nutrition Diagnosis                        Inadequate energy intake


      Etiology                                   Mech soft diet (inappropriate


                                                 diet choice)


      As Evidenced by Signs and Symptoms         poor appetite and 1% weight


                                                 loss in 9 days.


     Is patient on ventilator?                   No


     Is Patient Ambulatory and/or Out of Bed     Yes


     REE-([place]-ambulatory/OOB) [     [phone]


      [id number]]                               


     Kcal/Kg value to use for calculation        17


     Approximate Energy Requirements Using       1474


      kcal/Kg                                    


     Calculation Used for Recommendations        Kcal/kg


     Additional Notes                            PRO needs: 44 - 66g (0.8 - 1.2


                                                 g/kg AdBW 55 kg)


                                                 Fluid needs: 1 mL/kcal


 Nutrition Intervention


     Change Diet Order:                          GI Soft


     Goal #1                                     Meet 75% of energy and protein


                                                 needs.


     Goal #2                                     Diet tolerance


     Anticipated Discharge Needs:                GI soft diet


     Follow-Up By:                               09/08/20


     Additional Comments                         F/U on PO intake, diet


                                                 tolerance, and need for ONS.

## 2020-09-05 NOTE — DISCHARGE SUMMARY
Providers





- Providers


Date of Admission: 


08/27/20 11:30





Attending physician: 


BASILIA HUA





                                        





08/26/20 13:28


Consult to Physician [CONS] Urgent 


   Comment: 


   Consulting Provider: SYLVIE JIMENEZ


   Physician Instructions: 


   Reason For Exam: post op abd pain





08/26/20 13:29


Consult to Physician [CONS] Urgent 


   Comment: 


   Consulting Provider: JAMES ANTON


   Physician Instructions: 


   Reason For Exam: post op pain





08/26/20 13:31


Consult to Physician [CONS] Urgent 


   Comment: 


   Consulting Provider: ULICES VICTORIA


   Physician Instructions: 


   Reason For Exam: acute abd pain





09/02/20 15:16


Physical Therapy Evaluation and Treat [CONS] Stat 


   Comment: 


   Reason For Exam: status post abd. surgery


   Date of last referral: 09/02/20


   Referring MD: BASILIA HUA











Primary care physician: 


PRIMARY CARE MD








Hospitalization


Condition: Serious


Disposition: DC-01 TO HOME OR SELFCARE





- Discharge Diagnoses


(1) Perforation of sigmoid colon due to diverticulitis


Status: Acute   





(2) Obesity (BMI 30.0-34.9)


Status: Acute   





(3) FARHAN (acute kidney injury)


Status: Acute   





(4) DVT prophylaxis


Status: Acute   





Core Measure Documentation





- Palliative Care


Palliative Care/ Comfort Measures: Not Applicable





Exam





- Constitutional


Vitals: 


                                        











Temp Pulse Resp BP Pulse Ox


 


 98.2 F   80   18   188/112   99 


 


 09/05/20 08:49  09/05/20 11:37  09/05/20 08:49  09/05/20 12:21  09/05/20 11:37














Plan


Follow up with: 


PRIMARY CARE,MD [Primary Care Provider] - 3-5 Days


Prescriptions: 


levoFLOXacin [Levaquin TAB] 500 mg PO QDAY #10 tablet

## 2020-09-05 NOTE — PROGRESS NOTE
Assessment and Plan





- Patient Problems


(1) Abdominal pain


Current Visit: Yes   Status: Chronic   


Qualifiers: 


   Abdominal location: left lower quadrant   Qualified Code(s): R10.32 - Left 

lower quadrant pain   


Plan to address problem: 


Pt stable.  Patient appears much improved today.  Okay from general surgery 

standpoint for discharge.





Recommendations:


1.  Diet as tolerated


2.  Record daily drain outputs


3.  Continue with daily senna use


4.  Follow-up with general surgery in 2 weeks


5.  Encourage patient to work with physical therapy


6.  Would continue with oral Levaquin therapy upon discharge as she grew out the

same bacteria a in the suprapubic area as well as her urine.  Would plan to 

complete a two-week course.





Please call with any questions.





Time=10min














Subjective


Date of service: 09/05/20


Patient Reports: Positive: no new complaints, other (having mild occasional 

spikes of pain)





Objective


                               Vital Signs - 12hr











  09/05/20 09/05/20 09/05/20





  03:43 05:21 08:49


 


Temperature 100.0 F H  98.2 F


 


Pulse Rate 82 82 83


 


Respiratory 18  18





Rate   


 


Blood Pressure 183/113 183/113 185/112


 


O2 Sat by Pulse 98  100





Oximetry   














  09/05/20 09/05/20





  10:38 11:37


 


Temperature  


 


Pulse Rate 84 80


 


Respiratory  





Rate  


 


Blood Pressure  188/122


 


O2 Sat by Pulse  99





Oximetry  














- General physical appearance


no distress, no pain, obese





- Respiratory


normal expansion, normal respiratory effort





- Abdomen


soft, other (Urine with come bloody sediment. purulent fluid in suprapubic 

drain. Left flank drain with serous fluid)





- Psychiatric


oriented to time, oriented to person, oriented to place, speech is normal, 

memory intact





- Labs





                                 09/02/20 01:39





                                 09/01/20 04:42

## 2020-09-24 ENCOUNTER — HOSPITAL ENCOUNTER (OUTPATIENT)
Dept: HOSPITAL 5 - FLUORO | Age: 40
Discharge: HOME | End: 2020-09-24
Attending: UROLOGY
Payer: COMMERCIAL

## 2020-09-24 DIAGNOSIS — K21.9: ICD-10-CM

## 2020-09-24 DIAGNOSIS — I11.0: ICD-10-CM

## 2020-09-24 DIAGNOSIS — Z87.891: ICD-10-CM
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
## 2020-09-24 DIAGNOSIS — I50.9: ICD-10-CM

## 2020-09-24 DIAGNOSIS — E66.9: ICD-10-CM

## 2020-09-24 DIAGNOSIS — Z79.899: ICD-10-CM

## 2020-09-24 DIAGNOSIS — G47.30: ICD-10-CM

## 2020-09-24 DIAGNOSIS — S37.23XA: Primary | ICD-10-CM

## 2020-09-24 DIAGNOSIS — Z72.89: ICD-10-CM
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
## 2020-09-24 PROCEDURE — 74430 CONTRAST X-RAY BLADDER: CPT

## 2020-09-24 PROCEDURE — 51600 INJECTION FOR BLADDER X-RAY: CPT

## 2020-09-24 NOTE — FLUOROSCOPY REPORT
Fluoroscopic cystogram



Indication: Evaluate bladder perforation



Findings:  11 images of the pelvis were obtained while water-soluble contrast was gently instilled in
to the bladder via the patient's indwelling Gonsalves catheter.  There is a persistent bladder leak along
 the anterior superior bladder where there was previously shown to be a leak on CT. Contrast does not
 collect and dissipated throughout the exam.



IMPRESSION: Persistent anterior superior bladder leak.



Fluoroscopic time:  2 minutes 



Signer Name: Kel Lamar MD 

Signed: 9/24/2020 11:04 AM

Workstation Name: [id number]

## 2021-04-05 ENCOUNTER — HOSPITAL ENCOUNTER (INPATIENT)
Dept: HOSPITAL 5 - ED | Age: 41
LOS: 3 days | Discharge: HOME | DRG: 291 | End: 2021-04-08
Attending: INTERNAL MEDICINE | Admitting: HOSPITALIST
Payer: COMMERCIAL

## 2021-04-05 DIAGNOSIS — J18.9: ICD-10-CM

## 2021-04-05 DIAGNOSIS — I50.41: ICD-10-CM

## 2021-04-05 DIAGNOSIS — I11.0: Primary | ICD-10-CM

## 2021-04-05 DIAGNOSIS — I16.1: ICD-10-CM

## 2021-04-05 DIAGNOSIS — N17.9: ICD-10-CM

## 2021-04-05 DIAGNOSIS — I42.0: ICD-10-CM

## 2021-04-05 PROCEDURE — 71275 CT ANGIOGRAPHY CHEST: CPT

## 2021-04-05 PROCEDURE — 36415 COLL VENOUS BLD VENIPUNCTURE: CPT

## 2021-04-05 PROCEDURE — 96374 THER/PROPH/DIAG INJ IV PUSH: CPT

## 2021-04-05 PROCEDURE — A9502 TC99M TETROFOSMIN: HCPCS

## 2021-04-05 PROCEDURE — 80053 COMPREHEN METABOLIC PANEL: CPT

## 2021-04-05 PROCEDURE — 93306 TTE W/DOPPLER COMPLETE: CPT

## 2021-04-05 PROCEDURE — 84145 PROCALCITONIN (PCT): CPT

## 2021-04-05 PROCEDURE — 78452 HT MUSCLE IMAGE SPECT MULT: CPT

## 2021-04-05 PROCEDURE — 85025 COMPLETE CBC W/AUTO DIFF WBC: CPT

## 2021-04-05 PROCEDURE — 80048 BASIC METABOLIC PNL TOTAL CA: CPT

## 2021-04-05 PROCEDURE — 83880 ASSAY OF NATRIURETIC PEPTIDE: CPT

## 2021-04-05 PROCEDURE — 93017 CV STRESS TEST TRACING ONLY: CPT

## 2021-04-05 PROCEDURE — 93005 ELECTROCARDIOGRAM TRACING: CPT

## 2021-04-05 PROCEDURE — 94644 CONT INHLJ TX 1ST HOUR: CPT

## 2021-04-05 PROCEDURE — 85379 FIBRIN DEGRADATION QUANT: CPT

## 2021-04-05 PROCEDURE — 96375 TX/PRO/DX INJ NEW DRUG ADDON: CPT

## 2021-04-05 PROCEDURE — 84484 ASSAY OF TROPONIN QUANT: CPT

## 2021-04-05 PROCEDURE — 71046 X-RAY EXAM CHEST 2 VIEWS: CPT

## 2021-04-06 LAB
BASOPHILS # (AUTO): 0.1 K/MM3 (ref 0–0.1)
BASOPHILS NFR BLD AUTO: 1.3 % (ref 0–1.8)
BUN SERPL-MCNC: 18 MG/DL (ref 7–17)
BUN/CREAT SERPL: 13 %
CALCIUM SERPL-MCNC: 9.5 MG/DL (ref 8.4–10.2)
EOSINOPHIL # BLD AUTO: 0.1 K/MM3 (ref 0–0.4)
EOSINOPHIL NFR BLD AUTO: 1.3 % (ref 0–4.3)
HCT VFR BLD CALC: 39.1 % (ref 30.3–42.9)
HEMOLYSIS INDEX: 4
HGB BLD-MCNC: 13.1 GM/DL (ref 10.1–14.3)
LYMPHOCYTES # BLD AUTO: 1.8 K/MM3 (ref 1.2–5.4)
LYMPHOCYTES NFR BLD AUTO: 29.5 % (ref 13.4–35)
MCHC RBC AUTO-ENTMCNC: 34 % (ref 30–34)
MCV RBC AUTO: 86 FL (ref 79–97)
MONOCYTES # (AUTO): 0.4 K/MM3 (ref 0–0.8)
MONOCYTES % (AUTO): 5.9 % (ref 0–7.3)
PLATELET # BLD: 303 K/MM3 (ref 140–440)
RBC # BLD AUTO: 4.55 M/MM3 (ref 3.65–5.03)

## 2021-04-06 RX ADMIN — ACETAMINOPHEN PRN MG: 325 TABLET ORAL at 19:24

## 2021-04-06 RX ADMIN — CEFTRIAXONE SODIUM SCH MLS/HR: 1 INJECTION, POWDER, FOR SOLUTION INTRAMUSCULAR; INTRAVENOUS at 13:21

## 2021-04-06 RX ADMIN — AZITHROMYCIN SCH MG: 250 TABLET, FILM COATED ORAL at 13:21

## 2021-04-06 RX ADMIN — ACETAMINOPHEN PRN MG: 325 TABLET ORAL at 13:50

## 2021-04-06 NOTE — EVENT NOTE
ED Screening Note


ED Screening Note: 





This is a 41 yo female with hx of HTN who presents with shortness of breath and 

chest pain.








This initial assessment/diagnostic orders/clinical plan/treatment(s) is/are 

subject to change based on patients health status, clinical progression and re-

assessment by fellow clinical providers in the ED. Further treatment and workup 

at subsequent clinical providers discretion. Patient/guardian urged not to elope

from the ED as their condition may be serious if not clinically assessed and 

managed.

## 2021-04-06 NOTE — EMERGENCY DEPARTMENT REPORT
HPI





- General


Chief Complaint: Chest Pain


Time Seen by Provider: 04/06/21 02:06





- HPI


HPI: 





This is a 40-year-old female presents to the emergency department with a 1 week 

history of shortness of breath, nausea and vomiting.  She says that there is an 

occasional mixed dry and productive cough.  Patient also says that she has some 

generalized lower chest discomfort that has been going on secondary to the 

vomiting.  She denies any fever, dysuria, constipation, diarrhea, back pain.  

Her 9-month-old daughter is currently at home with upper respiratory type 

symptoms and the daughter just tested negative for Covid.  The patient has a 

past medical history of hypertension and does present with extremely elevated 

blood pressure.  She denies any tobacco or illicit drug use.  She does not have 

a primary care physician.  No recent travel or sick contacts at home.





ED Past Medical Hx





- Past Medical History


Previous Medical History?: Yes


Hx Hypertension: Yes


Hx Heart Attack/AMI: No


Hx Congestive Heart Failure: Yes


Hx Liver Disease: No


Hx Renal Disease: No


Hx Sickle Cell Disease: No


Hx Seizures: No


Hx Asthma: No


Hx COPD: No





- Surgical History


Past Surgical History?: Yes


Hx Pacemaker: No


Hx Internal Defibrillator: No


Additional Surgical History: Diverticulitis and bladder





- Social History


Smoking Status: Never Smoker





- Medications


Home Medications: 


                                Home Medications











 Medication  Instructions  Recorded  Confirmed  Last Taken  Type


 


Metoprolol [Lopressor TAB] 50 mg PO BID 07/26/18 04/06/21 07/26/18 History


 


cloNIDine [Catapres] 0.2 mg PO DAILY 07/26/18 04/06/21 07/26/18 History


 


lisinopriL [Zestril TAB] 40 mg PO QDAY 07/26/18 04/06/21 07/26/18 History














ED Review of Systems


ROS: 


Stated complaint: SOB CHEST PAINS


Other details as noted in HPI





Comment: All other systems reviewed and negative


Constitutional: denies: chills, fever


Eyes: denies: eye pain, vision change


ENT: denies: ear pain, throat pain


Respiratory: cough, shortness of breath


Cardiovascular: chest pain.  denies: edema


Gastrointestinal: denies: abdominal pain, vomiting


Genitourinary: denies: dysuria, discharge


Musculoskeletal: denies: back pain, arthralgia


Skin: denies: rash, lesions


Neurological: denies: headache, weakness





Physical Exam





- Physical Exam


Vital Signs: 


                                   Vital Signs











  04/06/21





  01:57


 


Temperature 98.2 F


 


Pulse Rate 126 H


 


Respiratory 17





Rate 


 


Blood Pressure 244/188


 


O2 Sat by Pulse 100





Oximetry 











Physical Exam: 





GENERAL: The patient is well-developed well-nourished.


HENT: Normocephalic.  Atraumatic.    Patient has moist mucous membranes.


EYES: Extraocular motions are intact.


NECK: Supple. Trachea is midline.


CHEST/LUNGS: Clear to auscultation.  There is some tachypnea but no accessory 

muscle use.  


HEART/CARDIOVASCULAR: Regular.  There is mild to moderate tachycardia.  There is

no murmur.


ABDOMEN: Abdomen is soft, nontender.  Patient has normal bowel sounds.  There is

no abdominal distention.


SKIN: Skin is warm and dry. 


NEURO: The patient is awake, alert, and oriented.  The patient is cooperative.  

The patient has no focal neurologic deficits.  Normal speech.


MUSCULOSKELETAL: There is no tenderness or deformity.  There is no limitation 

range of motion.  





ED Course


                                   Vital Signs











  04/06/21





  01:57


 


Temperature 98.2 F


 


Pulse Rate 126 H


 


Respiratory 17





Rate 


 


Blood Pressure 244/188


 


O2 Sat by Pulse 100





Oximetry 














ED Medical Decision Making





- Lab Data


Result diagrams: 


                                 04/06/21 00:59





                                 04/06/21 00:59





                                   Lab Results











  04/06/21 04/06/21 04/06/21 Range/Units





  00:59 00:59 00:59 


 


WBC  6.2    (4.5-11.0)  K/mm3


 


RBC  4.55    (3.65-5.03)  M/mm3


 


Hgb  13.1    (10.1-14.3)  gm/dl


 


Hct  39.1    (30.3-42.9)  %


 


MCV  86    (79-97)  fl


 


MCH  29    (28-32)  pg


 


MCHC  34    (30-34)  %


 


RDW  18.3 H    (13.2-15.2)  %


 


Plt Count  303    (140-440)  K/mm3


 


Lymph % (Auto)  29.5    (13.4-35.0)  %


 


Mono % (Auto)  5.9    (0.0-7.3)  %


 


Eos % (Auto)  1.3    (0.0-4.3)  %


 


Baso % (Auto)  1.3    (0.0-1.8)  %


 


Lymph # (Auto)  1.8    (1.2-5.4)  K/mm3


 


Mono # (Auto)  0.4    (0.0-0.8)  K/mm3


 


Eos # (Auto)  0.1    (0.0-0.4)  K/mm3


 


Baso # (Auto)  0.1    (0.0-0.1)  K/mm3


 


Seg Neutrophils %  62.0    (40.0-70.0)  %


 


Seg Neutrophils #  3.8    (1.8-7.7)  K/mm3


 


D-Dimer   438.75 H   (0-234)  ng/mlDDU


 


Sodium    138  (137-145)  mmol/L


 


Potassium    3.7  (3.6-5.0)  mmol/L


 


Chloride    101.0  ()  mmol/L


 


Carbon Dioxide    26  (22-30)  mmol/L


 


Anion Gap    15  mmol/L


 


BUN    18 H  (7-17)  mg/dL


 


Creatinine    1.4 H  (0.6-1.2)  mg/dL


 


Estimated GFR    50  ml/min


 


BUN/Creatinine Ratio    13  %


 


Glucose    110 H  ()  mg/dL


 


Calcium    9.5  (8.4-10.2)  mg/dL


 


Troponin T    0.012  (0.00-0.029)  ng/mL


 


NT-Pro-B Natriuret Pep     (0-450)  pg/mL














  04/06/21 04/06/21 Range/Units





  00:59 04:34 


 


WBC    (4.5-11.0)  K/mm3


 


RBC    (3.65-5.03)  M/mm3


 


Hgb    (10.1-14.3)  gm/dl


 


Hct    (30.3-42.9)  %


 


MCV    (79-97)  fl


 


MCH    (28-32)  pg


 


MCHC    (30-34)  %


 


RDW    (13.2-15.2)  %


 


Plt Count    (140-440)  K/mm3


 


Lymph % (Auto)    (13.4-35.0)  %


 


Mono % (Auto)    (0.0-7.3)  %


 


Eos % (Auto)    (0.0-4.3)  %


 


Baso % (Auto)    (0.0-1.8)  %


 


Lymph # (Auto)    (1.2-5.4)  K/mm3


 


Mono # (Auto)    (0.0-0.8)  K/mm3


 


Eos # (Auto)    (0.0-0.4)  K/mm3


 


Baso # (Auto)    (0.0-0.1)  K/mm3


 


Seg Neutrophils %    (40.0-70.0)  %


 


Seg Neutrophils #    (1.8-7.7)  K/mm3


 


D-Dimer    (0-234)  ng/mlDDU


 


Sodium    (137-145)  mmol/L


 


Potassium    (3.6-5.0)  mmol/L


 


Chloride    ()  mmol/L


 


Carbon Dioxide    (22-30)  mmol/L


 


Anion Gap    mmol/L


 


BUN    (7-17)  mg/dL


 


Creatinine    (0.6-1.2)  mg/dL


 


Estimated GFR    ml/min


 


BUN/Creatinine Ratio    %


 


Glucose    ()  mg/dL


 


Calcium    (8.4-10.2)  mg/dL


 


Troponin T   < 0.010  (0.00-0.029)  ng/mL


 


NT-Pro-B Natriuret Pep  53663 H   (0-450)  pg/mL














- EKG Data


-: EKG Interpreted by Me


EKG shows normal: sinus rhythm, axis, intervals, QRS complexes (Q waves to the 

septal leads, LVH), ST-T waves (Mild ST depression to the lateral leads)


Rate: tachycardia (126 bpm)





- EKG Data


When compared to previous EKG there are: no significant change


Interpretation: unchanged when compared t (8/5/20)





- Radiology Data


Radiology results: report reviewed, image reviewed


interpreted by me: 





Chest x-ray does not show any acute process.  There are no pleural effusions, 

obvious pneumonia and there is no pneumothorax. No significant cardiomegaly.





CTA CHEST WITH CONTRAST INDICATION / CLINICAL INFORMATION: SOB, elevated dimer. 

TECHNIQUE: Axial CT images were obtained through the chest after injection of IV

contrast. 3 plane MIP and/or 3D reconstructions were produced. All CT scans at 

this location are performed using CT d ose reduction for [employer] by means of 

automated exposure control. COMPARISON: CT scan dated 8/5/2020 FINDINGS: There 

is breathing motion artifact PULMONARY ARTERIES: No pulmonary emboli. THORACIC 

AORTA: No significant abnormality. HEART: Heart is enlarged. CORONARY ARTERY 

CALCIFICATION: Moderate. MEDIASTINUM / GENO: No significant abnormality. PLEURA:

No pleural effusion. No pneumothorax. LUNGS: No acute air space or interstitial 

disease. ADDITIONAL FINDINGS: None. UPPER ABDOMEN: No acute findings. SKELETAL 

STRUCTURES: No significant osseous abnormality. IMPRESSION: 1. No CT evidence 

for pulmonary embolism. 2. There is cardiomegaly 





- Medical Decision Making





This patient presents to the emergency department with a 1 week history of 

shortness of breath, nausea with vomiting, and more recently patient has 

developed some lower chest discomfort.  The patient presents with extremely 

elevated blood pressure and some tachycardia.  EKG did not have any morphology 

consistent with ST elevation myocardial infarction.  Chest x-ray did not show 

any pneumonia, pleural effusions, pneumothorax or any other acute process.





The patient's labs shows an elevated D-dimer level of about 440, some FARHAN with a

creatinine of 1.4 and a GFR of 50, and a very elevated proBNP of almost 20,000.





Due to the elevated D-dimer level, as well as the patient's complaint of 

shortness of breath with tachycardia, the patient had a CT angiography of the 

chest that did not show any pulmonary embolism, dissection, or any other acute 

process.





The patient was given a few doses of IV antihypertensive medication, a dose of 

IV antiemetic and a DuoNeb breathing treatment.  The patient still complains of 

having some chest tightness, shortness of breath with any exertion.





She will be admitted to the hospital for further evaluation and treatment was 

accepted for admission by the hospitalist, Dr. Croft. 


Critical Care Time: No


Critical care attestation.: 


If time is entered above; I have spent that time in minutes in the direct care 

of this critically ill patient, excluding procedure time.








ED Disposition


Clinical Impression: 


 Hypertensive urgency, malignant, Acute chest pain, FARHAN (acute kidney injury)





Dyspnea


Qualifiers:


 Dyspnea type: unspecified Qualified Code(s): R06.00 - Dyspnea, unspecified





CHF (congestive heart failure)


Qualifiers:


 Heart failure type: unspecified Heart failure chronicity: unspecified Qualified

Code(s): I50.9 - Heart failure, unspecified





Disposition: DC-09 OP ADMIT IP TO THIS HOSP


Is pt being admited?: Yes


Condition: Serious


Instructions:  Chest Pain (ED)


Time of Disposition: 05:27





HEART Score





- HEART Score


History: Slightly suspicious


EKG: Non-specific


Age: < 45


Risk factors: 1-2 risk factors


Troponin: 


                                        











Troponin T  0.012 ng/mL (0.00-0.029)   04/06/21  00:59    











Troponin: < normal limit


HEART Score: 2

## 2021-04-06 NOTE — ELECTROCARDIOGRAPH REPORT
[facility]

                                       

Test Date:    2021               Test Time:    02:03:30

Pat Name:     SHARAD PANDYA         Department:   

Patient ID:   [id number]          Room:         A476 1

Gender:       F                        Technician:   JOHNNIE

:          1980               Requested By: JESSICA PAREKH

Order Number: [id number]              Reading MD:   Derik Lentz

                                 Measurements

Intervals                              Axis          

Rate:         126                      P:            73

IL:           114                      QRS:          10

QRSD:         95                       T:            119

QT:           337                                    

QTc:          488                                    

                           Interpretive Statements

Sinus tachycardia

LAE, consider biatrial enlargement

LVH with secondary repolarization abnormality

Anterior Q waves, possibly due to LVH

No previous ECG available for comparison

Electronically Signed On 2021 10:39:15 EDT by Derik Lentz

## 2021-04-06 NOTE — XRAY REPORT
CHEST 2 VIEWS 



INDICATION / CLINICAL INFORMATION: Chest Pain shortness of breath.



COMPARISON: 8/5/2020



FINDINGS:



SUPPORT DEVICES: None.

HEART / MEDIASTINUM: There is mild enlargement of the cardiac silhouette 

LUNGS / PLEURA: There is mild patchy airspace opacity in the right lung base which could represent at
electasis or evolving pneumonia No pneumothorax. 



ADDITIONAL FINDINGS: No significant additional findings.



IMPRESSION:

1. There is mild patchy airspace opacity in the right base which could represent atelectasis or evolv
ing pneumonia.



Signer Name: Keron Heck MD 

Signed: 4/6/2021 1:49 AM

Workstation Name: [id number]

## 2021-04-06 NOTE — CAT SCAN REPORT
CTA CHEST WITH CONTRAST



INDICATION / CLINICAL INFORMATION: SOB, elevated dimer.



TECHNIQUE: Axial CT images were obtained through the chest after injection of IV contrast. 3 plane MI
P and/or 3D reconstructions were produced. All CT scans at this location are performed using CT dose 
reduction for [employer] by means of automated exposure control. 



COMPARISON: CT scan dated 8/5/2020



FINDINGS:



There is breathing motion artifact

PULMONARY ARTERIES: No pulmonary emboli.

THORACIC AORTA: No significant abnormality. 

HEART: Heart is enlarged.

CORONARY ARTERY CALCIFICATION: Moderate.

MEDIASTINUM / GENO: No significant abnormality.

PLEURA: No pleural effusion. No pneumothorax.

LUNGS: No acute air space or interstitial disease. 



ADDITIONAL FINDINGS: None.



UPPER ABDOMEN: No acute findings.



SKELETAL STRUCTURES: No significant osseous abnormality.



IMPRESSION:

1. No CT evidence for pulmonary embolism. 

2. There is cardiomegaly



Signer Name: Keron Heck MD 

Signed: 4/6/2021 4:37 AM

Workstation Name: [id number]

## 2021-04-06 NOTE — HISTORY AND PHYSICAL REPORT
History of Present Illness


Date of examination: 04/06/21


Date of admission: 


04/06/21 05:28





History of present illness: 





40-year-old female with a medical history of hypertension [not on medications] 

presents to the emergency department with a 1 week history of shortness of 

breath. She says that there is an occasional mixed dry and productive cough.  

Patient also says that she has some generalized lower chest discomfort that has 

been going on secondary to the vomiting.  She denies any fever, dysuria, 

constipation, diarrhea, back pain. She reports that she is supposed to be on 

blood pressure medications but has been out of medications for more than 6 

months as she does not have any job since her last hospitalization.  She denies 

any leg swelling.





In the ER, she was found to have markedly elevated blood pressures with SBP in 

the 200s.


Patient received IV labetalol with slight improvement.


Other work-up in the ER -chest x-ray showed right-sided atelectasis versus 

pneumonia.


Labs showed proBNP more than 19,000





Past History


Past Medical History: hypertension





Medications and Allergies


                                    Allergies











Allergy/AdvReac Type Severity Reaction Status Date / Time


 


No Known Allergies Allergy   Unverified 07/26/18 20:58











                                Home Medications











 Medication  Instructions  Recorded  Confirmed  Last Taken  Type


 


Metoprolol [Lopressor TAB] 50 mg PO BID 07/26/18 04/06/21 07/26/18 History


 


cloNIDine [Catapres] 0.2 mg PO DAILY 07/26/18 04/06/21 07/26/18 History


 


lisinopriL [Zestril TAB] 40 mg PO QDAY 07/26/18 04/06/21 07/26/18 History











Active Meds: 


Active Medications





Amlodipine Besylate (Amlodipine 10 Mg Tab)  10 mg PO QDAY [facility]


   Last Admin: 04/06/21 09:14 Dose:  10 mg


   Documented by: 


Clonidine HCl (Clonidine 0.1 Mg Tab)  0.2 mg PO Q12HR [facility]


Metoprolol Tartrate (Metoprolol Tartrate 50 Mg Tab)  50 mg PO BID [facility]


   Last Admin: 04/06/21 09:15 Dose:  50 mg


   Documented by: 











Review of Systems


All systems: negative





Exam





- Constitutional


Vitals: 


                                        











Temp Pulse Resp BP Pulse Ox


 


 98.2 F   106 H  18   211/149   100 


 


 04/06/21 01:57  04/06/21 10:00  04/06/21 10:00  04/06/21 08:39  04/06/21 10:00











General appearance: Present: no acute distress, well-nourished





- EENT


Eyes: Present: PERRL


ENT: hearing intact, clear oral mucosa





- Neck


Neck: Present: supple, normal ROM





- Respiratory


Respiratory effort: normal


Respiratory: bilateral: CTA





- Cardiovascular


Heart Sounds: Present: S1 & S2.  Absent: rub, click





- Extremities


Extremities: pulses symmetrical, No edema


Peripheral Pulses: within normal limits





- Abdominal


General gastrointestinal: Present: soft, non-tender, non-distended, normal bowel

sounds


Female genitourinary: Present: normal





- Integumentary


Integumentary: Present: clear, warm, dry





- Musculoskeletal


Musculoskeletal: gait normal, strength equal bilaterally





- Psychiatric


Psychiatric: appropriate mood/affect, intact judgment & insight





- Neurologic


Neurologic: CNII-XII intact, moves all extremities





HEART Score





- HEART Score


EKG: Non-specific


Age: < 45


Risk factors: 1-2 risk factors


Troponin: 


                                        











Troponin T  < 0.010 ng/mL (0.00-0.029)   04/06/21  04:34    











Troponin: < normal limit





Results





- Labs


CBC & Chem 7: 


                                 04/06/21 00:59





                                 04/06/21 00:59


Labs: 


                             Laboratory Last Values











WBC  6.2 K/mm3 (4.5-11.0)   04/06/21  00:59    


 


RBC  4.55 M/mm3 (3.65-5.03)   04/06/21  00:59    


 


Hgb  13.1 gm/dl (10.1-14.3)   04/06/21  00:59    


 


Hct  39.1 % (30.3-42.9)   04/06/21  00:59    


 


MCV  86 fl (79-97)   04/06/21  00:59    


 


MCH  29 pg (28-32)   04/06/21  00:59    


 


MCHC  34 % (30-34)   04/06/21  00:59    


 


RDW  18.3 % (13.2-15.2)  H  04/06/21  00:59    


 


Plt Count  303 K/mm3 (140-440)   04/06/21  00:59    


 


Lymph % (Auto)  29.5 % (13.4-35.0)   04/06/21  00:59    


 


Mono % (Auto)  5.9 % (0.0-7.3)   04/06/21  00:59    


 


Eos % (Auto)  1.3 % (0.0-4.3)   04/06/21  00:59    


 


Baso % (Auto)  1.3 % (0.0-1.8)   04/06/21  00:59    


 


Lymph # (Auto)  1.8 K/mm3 (1.2-5.4)   04/06/21  00:59    


 


Mono # (Auto)  0.4 K/mm3 (0.0-0.8)   04/06/21  00:59    


 


Eos # (Auto)  0.1 K/mm3 (0.0-0.4)   04/06/21  00:59    


 


Baso # (Auto)  0.1 K/mm3 (0.0-0.1)   04/06/21  00:59    


 


Seg Neutrophils %  62.0 % (40.0-70.0)   04/06/21  00:59    


 


Seg Neutrophils #  3.8 K/mm3 (1.8-7.7)   04/06/21  00:59    


 


D-Dimer  438.75 ng/mlDDU (0-234)  H  04/06/21  00:59    


 


Sodium  138 mmol/L (137-145)   04/06/21  00:59    


 


Potassium  3.7 mmol/L (3.6-5.0)   04/06/21  00:59    


 


Chloride  101.0 mmol/L ()   04/06/21  00:59    


 


Carbon Dioxide  26 mmol/L (22-30)   04/06/21  00:59    


 


Anion Gap  15 mmol/L  04/06/21  00:59    


 


BUN  18 mg/dL (7-17)  H  04/06/21  00:59    


 


Creatinine  1.4 mg/dL (0.6-1.2)  H  04/06/21  00:59    


 


Estimated GFR  50 ml/min  04/06/21  00:59    


 


BUN/Creatinine Ratio  13 %  04/06/21  00:59    


 


Glucose  110 mg/dL ()  H  04/06/21  00:59    


 


Calcium  9.5 mg/dL (8.4-10.2)   04/06/21  00:59    


 


Troponin T  < 0.010 ng/mL (0.00-0.029)   04/06/21  04:34    


 


NT-Pro-B Natriuret Pep  31890 pg/mL (0-450)  H  04/06/21  00:59    














Assessment and Plan


Assessment and plan: 


#Hypertensive emergency


Received IV labetalol in the ER


Plan to reduce blood pressure gradually as patient has been out of medications 

for about 6 months


Started on clonidine  twice daily


Amlodipine


Continue monitor blood pressure closely





#Accelerated hypertension


Management as per above





#Elevated proBNP


Likely due to acute heart failure with elevated blood pressure


Echocardiogram ordered





#Possible right-sided pneumonia versus atelectasis


Antibiotics for now


Check procalcitonin and discontinue if negative





#FARHAN


Likely vasomotor nephropathy


Monitor renal function


Avoid nephrotoxic medications





#DVT prophylaxis-Heparin





Full code

## 2021-04-07 LAB
ALBUMIN SERPL-MCNC: 3.4 G/DL (ref 3.9–5)
ALT SERPL-CCNC: 30 UNITS/L (ref 7–56)
BASOPHILS # (AUTO): 0 K/MM3 (ref 0–0.1)
BASOPHILS NFR BLD AUTO: 0.7 % (ref 0–1.8)
BUN SERPL-MCNC: 11 MG/DL (ref 7–17)
BUN/CREAT SERPL: 10 %
CALCIUM SERPL-MCNC: 9.2 MG/DL (ref 8.4–10.2)
EOSINOPHIL # BLD AUTO: 0 K/MM3 (ref 0–0.4)
EOSINOPHIL NFR BLD AUTO: 1.3 % (ref 0–4.3)
HCT VFR BLD CALC: 38.8 % (ref 30.3–42.9)
HEMOLYSIS INDEX: 2
HGB BLD-MCNC: 12.9 GM/DL (ref 10.1–14.3)
LYMPHOCYTES # BLD AUTO: 0.9 K/MM3 (ref 1.2–5.4)
LYMPHOCYTES NFR BLD AUTO: 24.9 % (ref 13.4–35)
MCHC RBC AUTO-ENTMCNC: 33 % (ref 30–34)
MCV RBC AUTO: 86 FL (ref 79–97)
MONOCYTES # (AUTO): 0.4 K/MM3 (ref 0–0.8)
MONOCYTES % (AUTO): 12.1 % (ref 0–7.3)
PLATELET # BLD: 261 K/MM3 (ref 140–440)
RBC # BLD AUTO: 4.51 M/MM3 (ref 3.65–5.03)

## 2021-04-07 RX ADMIN — SPIRONOLACTONE SCH MG: 25 TABLET ORAL at 11:06

## 2021-04-07 RX ADMIN — FUROSEMIDE SCH MG: 10 INJECTION, SOLUTION INTRAVENOUS at 18:17

## 2021-04-07 RX ADMIN — AZITHROMYCIN SCH MG: 250 TABLET, FILM COATED ORAL at 09:20

## 2021-04-07 RX ADMIN — ACETAMINOPHEN PRN MG: 325 TABLET ORAL at 16:38

## 2021-04-07 RX ADMIN — CEFTRIAXONE SODIUM SCH MLS/HR: 1 INJECTION, POWDER, FOR SOLUTION INTRAMUSCULAR; INTRAVENOUS at 13:52

## 2021-04-07 RX ADMIN — FUROSEMIDE SCH MG: 10 INJECTION, SOLUTION INTRAVENOUS at 11:06

## 2021-04-07 NOTE — PROGRESS NOTE
Assessment and Plan


Assessment and plan: 


#Hypertensive emergency


Received IV labetalol in the ER


Continue current blood pressure medications and continue to adjust as needed





#Accelerated hypertension


Management as per above





#Acute systolic and diastolic heart failure


Echocardiogram showed severe LV dysfunction with EF 15 to 20%


Cardiology has been consulted as a result


Plan as per cardiologist for a stress test tomorrow.  Make n.p.o. after midnight


Continue current medications


Diuretics as needed





#Possible right-sided pneumonia versus atelectasis


Antibiotics for now


Check procalcitonin and discontinue if negative





#FARHAN


Likely vasomotor nephropathy


Monitor renal function


Avoid nephrotoxic medications





#DVT prophylaxis-Heparin





Full code





History


Interval history: 





40-year-old female with a medical history of hypertension [not on medications] 

presents to the emergency department with a 1 week history of shortness of 

breath. She says that there is an occasional mixed dry and productive cough.  

Patient also says that she has some generalized lower chest discomfort that has 

been going on secondary to the vomiting.  She denies any fever, dysuria, 

constipation, diarrhea, back pain. She reports that she is supposed to be on 

blood pressure medications but has been out of medications for more than 6 

months as she does not have any job since her last hospitalization.  She denies 

any leg swelling.





In the ER, she was found to have markedly elevated blood pressures with SBP in 

the 200s.


Patient received IV labetalol with slight improvement.


Other work-up in the ER -chest x-ray showed right-sided atelectasis versus 

pneumonia.


Labs showed proBNP more than 19,000





Hospital course





4/7.  Patient was started on diuretics and an echocardiogram was ordered.  She 

was also started on IV antibiotic for possible pneumonia.  Blood pressure was 

controlled with IV medication and subsequently transitioned to oral medications.

 Echocardiogram showed systolic heart failure with EF in the 15 to 20% so 

cardiology was consulted.  Plan for Lexiscan as per cardiology tomorrow.





Hospitalist Physical





- Constitutional


Vitals: 


                                        











Temp Pulse Resp BP Pulse Ox


 


 98.1 F   90   18   161/113   96 


 


 04/07/21 08:15  04/07/21 04:16  04/07/21 08:15  04/07/21 08:15  04/07/21 04:16











General appearance: Present: no acute distress, well-nourished





HEART Score





- HEART Score


EKG: Non-specific


Age: < 45


Risk factors: 1-2 risk factors


Troponin: 


                                        











Troponin T  < 0.010 ng/mL (0.00-0.029)   04/06/21  04:34    











Troponin: < normal limit





Results





- Labs


CBC & Chem 7: 


                                 04/07/21 06:39





                                 04/07/21 06:39


Labs: 


                             Laboratory Last Values











WBC  3.5 K/mm3 (4.5-11.0)  L  04/07/21  06:39    


 


RBC  4.51 M/mm3 (3.65-5.03)   04/07/21  06:39    


 


Hgb  12.9 gm/dl (10.1-14.3)   04/07/21  06:39    


 


Hct  38.8 % (30.3-42.9)   04/07/21  06:39    


 


MCV  86 fl (79-97)   04/07/21  06:39    


 


MCH  29 pg (28-32)   04/07/21  06:39    


 


MCHC  33 % (30-34)   04/07/21  06:39    


 


RDW  18.4 % (13.2-15.2)  H  04/07/21  06:39    


 


Plt Count  261 K/mm3 (140-440)   04/07/21  06:39    


 


Lymph % (Auto)  24.9 % (13.4-35.0)   04/07/21  06:39    


 


Mono % (Auto)  12.1 % (0.0-7.3)  H  04/07/21  06:39    


 


Eos % (Auto)  1.3 % (0.0-4.3)   04/07/21  06:39    


 


Baso % (Auto)  0.7 % (0.0-1.8)   04/07/21  06:39    


 


Lymph # (Auto)  0.9 K/mm3 (1.2-5.4)  L  04/07/21  06:39    


 


Mono # (Auto)  0.4 K/mm3 (0.0-0.8)   04/07/21  06:39    


 


Eos # (Auto)  0.0 K/mm3 (0.0-0.4)   04/07/21  06:39    


 


Baso # (Auto)  0.0 K/mm3 (0.0-0.1)   04/07/21  06:39    


 


Seg Neutrophils %  61.0 % (40.0-70.0)   04/07/21  06:39    


 


Seg Neutrophils #  2.1 K/mm3 (1.8-7.7)   04/07/21  06:39    


 


D-Dimer  438.75 ng/mlDDU (0-234)  H  04/06/21  00:59    


 


Sodium  137 mmol/L (137-145)   04/07/21  06:39    


 


Potassium  3.3 mmol/L (3.6-5.0)  L  04/07/21  06:39    


 


Chloride  98.7 mmol/L ()   04/07/21  06:39    


 


Carbon Dioxide  29 mmol/L (22-30)   04/07/21  06:39    


 


Anion Gap  13 mmol/L  04/07/21  06:39    


 


BUN  11 mg/dL (7-17)   04/07/21  06:39    


 


Creatinine  1.1 mg/dL (0.6-1.2)   04/07/21  06:39    


 


Estimated GFR  > 60 ml/min  04/07/21  06:39    


 


BUN/Creatinine Ratio  10 %  04/07/21  06:39    


 


Glucose  81 mg/dL ()   04/07/21  06:39    


 


Calcium  9.2 mg/dL (8.4-10.2)   04/07/21  06:39    


 


Total Bilirubin  0.60 mg/dL (0.1-1.2)   04/07/21  06:39    


 


AST  36 units/L (5-40)   04/07/21  06:39    


 


ALT  30 units/L (7-56)   04/07/21  06:39    


 


Alkaline Phosphatase  122 units/L ()   04/07/21  06:39    


 


Troponin T  < 0.010 ng/mL (0.00-0.029)   04/06/21  04:34    


 


NT-Pro-B Natriuret Pep  55007 pg/mL (0-450)  H  04/06/21  00:59    


 


Total Protein  6.7 g/dL (6.3-8.2)   04/07/21  06:39    


 


Albumin  3.4 g/dL (3.9-5)  L  04/07/21  06:39    


 


Albumin/Globulin Ratio  1.0 %  04/07/21  06:39    











Gonsalves/IV: 


                                        





Voiding Method                   Incontinent











Active Medications





- Current Medications


Current Medications: 














Generic Name Dose Route Start Last Admin





  Trade Name Freq  PRN Reason Stop Dose Admin


 


Acetaminophen  650 mg  04/06/21 14:00  04/06/21 19:24





  Acetaminophen 325 Mg Tab  PO   650 mg





  Q6H PRN   Administration





  Pain, Mild (1-3)  


 


Amlodipine Besylate  10 mg  04/06/21 08:30  04/07/21 09:20





  Amlodipine 10 Mg Tab  PO   10 mg





  QDAY ANKUSH   Administration


 


Azithromycin  500 mg  04/06/21 13:00  04/07/21 09:20





  Azithromycin 250 Mg Tab  PO   500 mg





  QDAY ANKUSH   Administration





  Protocol  


 


Carvedilol  12.5 mg  04/07/21 10:00  04/07/21 09:23





  Carvedilol 6.25 Mg Tab  PO   12.5 mg





  BID ANKUSH   Administration


 


Clonidine HCl  0.2 mg  04/07/21 09:00  04/07/21 09:20





  Clonidine 0.1 Mg Tab  PO   0.2 mg





  Q8HR ANKUSH   Administration


 


Furosemide  40 mg  04/07/21 10:00 





  Furosemide 40 Mg/4 Ml Inj  IV  





  0600,1800 ANKUSH  


 


Hydralazine HCl  50 mg  04/06/21 14:00  04/07/21 05:16





  Hydralazine 25 Mg Tab  PO   50 mg





  Q8HR ANKUSH   Administration


 


Hydralazine HCl  10 mg  04/06/21 23:50  04/07/21 00:08





  Hydralazine 20 Mg/1 Ml Inj  IV   10 mg





  Q6H PRN   Administration





  Hypertension  


 


Ceftriaxone Sodium  1 gm in 50 mls @ 100 mls/hr  04/06/21 13:00  04/06/21 13:21





  Rocephin/Ns 1 Gm/50 Ml  IV   100 mls/hr





  Q24H ANKUSH   Administration





  Protocol  


 


Spironolactone  25 mg  04/07/21 10:00 





  Spironolactone 25 Mg Tab  PO  





  QDAY ANKUSH

## 2021-04-07 NOTE — CONSULTATION
History of Present Illness


Consult date: 04/07/21


Consult reason: congestive heart failure


History of present illness: 





This is a 40-year old F with a history of chronic hypertension, noncompliant 

with medications and outpatient follow up. No prior cardiac history. Patient who

presents to this hospital, admitted with shortness of breath and severe 

uncontrolled hypertension, systolic . Chest CTA reports cardiomegaly but 

no evidence of pulmonary embolism. Further evaluation with an echocardiogram 

demonstrated a decrease left ventricular systolic function, ejection fraction 

15-20%. It would be recalled just 8 months ago, she had an echo that showed a 

normal systolic function, ejection fraction 55-60%. 





Patient denies chest pain, denies palpitations, and there is no lower extremity 

edema. An ECG is sinus rhythm, LVH with repolarization abnormality. A cardiology

consultation has been requested for CHF. 





Past History


Past Medical History: hypertension





Medications and Allergies


                                    Allergies











Allergy/AdvReac Type Severity Reaction Status Date / Time


 


No Known Allergies Allergy   Unverified 07/26/18 20:58











                                Home Medications











 Medication  Instructions  Recorded  Confirmed  Last Taken  Type


 


Metoprolol [Lopressor TAB] 50 mg PO BID 07/26/18 04/06/21 07/26/18 History


 


cloNIDine [Catapres] 0.2 mg PO DAILY 07/26/18 04/06/21 07/26/18 History


 


lisinopriL [Zestril TAB] 40 mg PO QDAY 07/26/18 04/06/21 07/26/18 History











Active Meds: 


Active Medications





Acetaminophen (Acetaminophen 325 Mg Tab)  650 mg PO Q6H PRN


   PRN Reason: Pain, Mild (1-3)


   Last Admin: 04/06/21 19:24 Dose:  650 mg


   Documented by: 


Amlodipine Besylate (Amlodipine 10 Mg Tab)  10 mg PO QDAY [facility]


   Last Admin: 04/06/21 09:14 Dose:  10 mg


   Documented by: 


Azithromycin (Azithromycin 250 Mg Tab)  500 mg PO QDAY [facility]; Protocol


   Last Admin: 04/06/21 13:21 Dose:  500 mg


   Documented by: 


Carvedilol (Carvedilol 6.25 Mg Tab)  12.5 mg PO BID ANKUSH


Clonidine HCl (Clonidine 0.1 Mg Tab)  0.2 mg PO Q8HR [facility]


Hydralazine HCl (Hydralazine 25 Mg Tab)  50 mg PO Q8HR [facility]


   Last Admin: 04/07/21 05:16 Dose:  50 mg


   Documented by: 


Hydralazine HCl (Hydralazine 20 Mg/1 Ml Inj)  10 mg IV Q6H PRN


   PRN Reason: Hypertension


   Last Admin: 04/07/21 00:08 Dose:  10 mg


   Documented by: 


Ceftriaxone Sodium (Rocephin/Ns 1 Gm/50 Ml)  1 gm in 50 mls @ 100 mls/hr IV Q24H

[facility]; Protocol


   Last Admin: 04/06/21 13:21 Dose:  100 mls/hr


   Documented by: 











Review of Systems


Cardiovascular: dyspnea on exertion, no chest pain, no palpitations, no edema, 

no syncope





Physical Examination


                                   Vital Signs











Temp Pulse Resp BP Pulse Ox


 


 98.2 F   126 H  17   244/188   100 


 


 04/06/21 01:57  04/06/21 01:57  04/06/21 01:57  04/06/21 01:57  04/06/21 01:57











General appearance: no acute distress


HEENT: Positive: PERRL


Neck: Positive: trachea midline


Cardiac: Positive: Reg Rate and Rhythm


Lungs: Positive: Decreased Breath Sounds


Neuro: Positive: Grossly Intact


Extremities: Absent: edema





Results





                                 04/07/21 06:39





                                 04/07/21 06:39


                                 Cardiac Enzymes











  04/07/21 Range/Units





  06:39 


 


AST  36  (5-40)  units/L








                                       CBC











  04/07/21 Range/Units





  06:39 


 


WBC  3.5 L  (4.5-11.0)  K/mm3


 


RBC  4.51  (3.65-5.03)  M/mm3


 


Hgb  12.9  (10.1-14.3)  gm/dl


 


Hct  38.8  (30.3-42.9)  %


 


Plt Count  261  (140-440)  K/mm3


 


Lymph # (Auto)  0.9 L  (1.2-5.4)  K/mm3


 


Mono # (Auto)  0.4  (0.0-0.8)  K/mm3


 


Eos # (Auto)  0.0  (0.0-0.4)  K/mm3


 


Baso # (Auto)  0.0  (0.0-0.1)  K/mm3








                          Comprehensive Metabolic Panel











  04/07/21 Range/Units





  06:39 


 


Sodium  137  (137-145)  mmol/L


 


Potassium  3.3 L  (3.6-5.0)  mmol/L


 


Chloride  98.7  ()  mmol/L


 


Carbon Dioxide  29  (22-30)  mmol/L


 


BUN  11  (7-17)  mg/dL


 


Creatinine  1.1  (0.6-1.2)  mg/dL


 


Glucose  81  ()  mg/dL


 


Calcium  9.2  (8.4-10.2)  mg/dL


 


AST  36  (5-40)  units/L


 


ALT  30  (7-56)  units/L


 


Alkaline Phosphatase  122  ()  units/L


 


Total Protein  6.7  (6.3-8.2)  g/dL


 


Albumin  3.4 L  (3.9-5)  g/dL














Assessment and Plan





- Patient Problems


(1) CHF (congestive heart failure)


Current Visit: Yes   Status: Acute   


Qualifiers: 


   Qualified Code(s): I50.9 - Heart failure, unspecified   


Plan to address problem: 





An echocardiogram this presentation demonstrated a decrease left ventricular 

systolic function, ejection fraction 15-20%. 


8/2020 echo showed a normal systolic function, ejection fraction 55-60%. 





Recommendations:


Advised a low sodium diet and fluid restriction.


Guideline directed medical therapy for dilated cardiomyopathy.


A Lexiscan thallium stress test will be ordered for tomorrow for further 

ischemic cardiac assessment.








(2) Uncontrolled hypertension


Current Visit: No   Status: Acute   


Plan to address problem: 


We will recommend blood pressure control.

## 2021-04-08 VITALS — DIASTOLIC BLOOD PRESSURE: 111 MMHG | SYSTOLIC BLOOD PRESSURE: 165 MMHG

## 2021-04-08 LAB
ALBUMIN SERPL-MCNC: 3.3 G/DL (ref 3.9–5)
ALT SERPL-CCNC: 26 UNITS/L (ref 7–56)
BASOPHILS # (AUTO): 0 K/MM3 (ref 0–0.1)
BASOPHILS NFR BLD AUTO: 0.4 % (ref 0–1.8)
BUN SERPL-MCNC: 14 MG/DL (ref 7–17)
BUN/CREAT SERPL: 11 %
CALCIUM SERPL-MCNC: 9.1 MG/DL (ref 8.4–10.2)
EOSINOPHIL # BLD AUTO: 0.1 K/MM3 (ref 0–0.4)
EOSINOPHIL NFR BLD AUTO: 2 % (ref 0–4.3)
HCT VFR BLD CALC: 42.9 % (ref 30.3–42.9)
HEMOLYSIS INDEX: 6
HGB BLD-MCNC: 14.4 GM/DL (ref 10.1–14.3)
LYMPHOCYTES # BLD AUTO: 1.2 K/MM3 (ref 1.2–5.4)
LYMPHOCYTES NFR BLD AUTO: 34.1 % (ref 13.4–35)
MCHC RBC AUTO-ENTMCNC: 34 % (ref 30–34)
MCV RBC AUTO: 87 FL (ref 79–97)
MONOCYTES # (AUTO): 0.4 K/MM3 (ref 0–0.8)
MONOCYTES % (AUTO): 11.9 % (ref 0–7.3)
PLATELET # BLD: 283 K/MM3 (ref 140–440)
RBC # BLD AUTO: 4.94 M/MM3 (ref 3.65–5.03)

## 2021-04-08 RX ADMIN — POTASSIUM CHLORIDE SCH: 10 INJECTION, SOLUTION INTRAVENOUS at 13:55

## 2021-04-08 RX ADMIN — SPIRONOLACTONE SCH MG: 25 TABLET ORAL at 12:00

## 2021-04-08 RX ADMIN — CEFTRIAXONE SODIUM SCH MLS/HR: 1 INJECTION, POWDER, FOR SOLUTION INTRAMUSCULAR; INTRAVENOUS at 14:00

## 2021-04-08 RX ADMIN — AZITHROMYCIN SCH MG: 250 TABLET, FILM COATED ORAL at 12:01

## 2021-04-08 RX ADMIN — POTASSIUM CHLORIDE SCH: 10 INJECTION, SOLUTION INTRAVENOUS at 13:56

## 2021-04-08 RX ADMIN — FUROSEMIDE SCH MG: 10 INJECTION, SOLUTION INTRAVENOUS at 05:09

## 2021-04-08 RX ADMIN — POTASSIUM CHLORIDE SCH MLS/HR: 10 INJECTION, SOLUTION INTRAVENOUS at 11:44

## 2021-04-08 NOTE — EVENT NOTE
Date: 04/08/21





The patient underwent a Lexiscan thallium stress test today, which showed a 

dilated cardiomyopathy with severe left ventricular systolic dysfunction.  The 

myocardial perfusion images were normal with homogeneous uptake of the tracer in

all segments, results are suggestive of a nonischemic cardiomyopathy.  





Recommendations:


Stable for cardiac discharge and outpatient management.


Optimal medical therapy with losartan, carvedilol, furosemide, spironolactone 

and baby aspirin.


Low-salt diet and outpatient cardiac follow-up in 7 to 10 days.

## 2021-04-08 NOTE — NUCLEAR MEDICINE REPORT
--------------- APPROVED REPORT --------------





Exam: Nuclear Stress Test



BMI:  0



Stress Test Details

HR

Max Heart Rate (APMHR): 180 bpm 

Target HR (85% APMHR): 153 bpm



BP

ECG

Resting ECG:  Sinus Rhythm

Stress ECG:     Sinus Rhythm

ST Change: None

Arrhythmia:    None

Recovery ECG: Sinus Rhythm

Recovery ST Change: None

Recovery Arrhythmia: None



Stress ECG Conclusion

No chest pain, no ST changes of ischemia with Lexiscan stress testing.  Await thallium perfusion 

images for final test interpretation.



NM EXAM: Myocardial Perfusion REST/STRESS

Imaging Protocol: Rest Tc-99m/Stress Tc-99m 1 day



Resting Data

Rest SPECT myocardial perfusion imaging was performed in supine position 45 minutes following the 

intravenous injection of 10 mCi of Tc-99m Myoview.

Time of rest injection: 0700     



Pharmacologic Stress

Pharmacologic stress test was performed by injecting Regadenoson 0.4 mg IV push followed by the 

intravenous injection of 28 mCi of Tc-99m Myoview.

Time of stress injection: 0945     

Gated Stress SPECT was performed 30 minutes after stress injection.

The images were gated to evaluate regional wall motion and calculate left ventricular ejection 

fraction. 



Study Quality

Study: excellent

Lung Uptake: Normal



Study Data

TID = 1.02.



Perfusion

The rest and stress images show normal perfusion.

Normal perfusion on both the stress and rest images.



Wall Motion

There is a dilated left ventricle with severe diffuse global left ventricular hypokinesis.

Severely decreased left ventricular systolic function.



Nuclear Conclusion

No ischemic defects, normal myocardial perfusion in all coronary segments.

There is a dilated cardiomyopathy with severe left ventricular systolic dysfunction, ejection 

fraction 28%, suggestive of a nonischemic cardiomyopathy.

Clinical correlation is recommended. 



Conclusion

No chest pain, no ST changes of ischemia with Lexiscan stress testing.  Await thallium perfusion 

images for final test interpretation.

## 2021-04-08 NOTE — DISCHARGE SUMMARY
Providers





- Providers


Date of Admission: 


04/06/21 05:28





Date of discharge: 04/08/21


Attending physician: 


SILAS EID





                                        





04/06/21 12:29


Consult to Physician [CONS] Routine 


   Comment: 


   Consulting Provider: FARIDEH LOPEZ


   Physician Instructions: 


   Reason For Exam: New onset systolic heart failure











Primary care physician: 


PRIMARY CARE MD








Hospitalization


Condition: Serious


Hospital course: 





40-year-old female with a medical history of hypertension [not on medications] 

presents to the emergency department with a 1 week history of shortness of 

breath. She says that there is an occasional mixed dry and productive cough.  

Patient also says that she has some generalized lower chest discomfort that has 

been going on secondary to the vomiting.  She denies any fever, dysuria, 

constipation, diarrhea, back pain. She reports that she is supposed to be on 

blood pressure medications but has been out of medications for more than 6 

months as she does not have any job since her last hospitalization.  She denies 

any leg swelling.





In the ER, she was found to have markedly elevated blood pressures with SBP in 

the 200s.


Patient received IV labetalol with slight improvement.


Other work-up in the ER -chest x-ray showed right-sided atelectasis versus 

pneumonia.


Labs showed proBNP more than 19,000





Hospital course





4/7.  Patient was started on diuretics and an echocardiogram was ordered.  She 

was also started on IV antibiotic for possible pneumonia.  Blood pressure was 

controlled with IV medication and subsequently transitioned to oral medications.

 Echocardiogram showed systolic heart failure with EF in the 15 to 20% so 

cardiology was consulted.  Plan for Lexiscan as per cardiology tomorrow.





4/8. Her stress test is negative. She will be discharged on cardiac medications 

and follow up with cardiologist in the office. She agrees with plan.  


Disposition: [id number] TO HOME OR SELFCARE


Final Discharge Diagnosis (Prints w/discharge instructions): Acute systolic 

heart failure


Time spent for discharge: 35 mins 





- Discharge Diagnoses


(1) Acute systolic heart failure


Status: Acute   





(2) FARHAN (acute kidney injury)


Status: Acute   





(3) Hypertensive urgency, malignant


Status: Acute   





Core Measure Documentation





- Palliative Care


Palliative Care/ Comfort Measures: Not Applicable





- Core Measures


Any of the following diagnoses?: none





Exam





- Constitutional


Vitals: 


                                        











Temp Pulse Resp BP Pulse Ox


 


 98.8 F   88   16   142/106   100 


 


 04/08/21 05:17  04/08/21 12:02  04/08/21 10:53  04/08/21 10:53  04/08/21 10:53











General appearance: Present: no acute distress, well-nourished





- EENT


Eyes: Present: PERRL


ENT: hearing intact, clear oral mucosa





- Neck


Neck: Present: supple, normal ROM





- Respiratory


Respiratory effort: normal


Respiratory: bilateral: CTA





- Cardiovascular


Heart Sounds: Present: S1 & S2.  Absent: rub, click





- Extremities


Extremities: pulses symmetrical, No edema


Peripheral Pulses: within normal limits





- Abdominal


General gastrointestinal: Present: soft, non-tender, non-distended, normal bowel

sounds


Female genitourinary: Present: normal





- Integumentary


Integumentary: Present: clear, warm, dry





- Musculoskeletal


Musculoskeletal: gait normal, strength equal bilaterally





- Psychiatric


Psychiatric: appropriate mood/affect, intact judgment & insight





- Neurologic


Neurologic: CNII-XII intact, moves all extremities





Plan


Diet: low fat, low cholesterol, low salt


Additional Instructions: Continue medications as prescribed.  Take low salt 

diet.  Follow up with cardiology in the office


Follow up with: 


PRIMARY CARE,MD [Primary Care Provider] - 3-5 Days


FARIDEH LOPEZ MD [Staff Physician] - 7 Days


Prescriptions: 


Spironolactone [Aldactone] 25 mg PO QDAY #30 tablet


carvediloL [Coreg] 12.5 mg PO BID #60 tablet


Aspirin EC [Halfprin EC] 81 mg PO QDAY #30 tablet.


Furosemide [Lasix TAB] 40 mg PO QDAY #30 tablet


lisinopriL [Lisinopril] 20 mg PO DAILY #30 tablet